# Patient Record
Sex: MALE | Race: OTHER
[De-identification: names, ages, dates, MRNs, and addresses within clinical notes are randomized per-mention and may not be internally consistent; named-entity substitution may affect disease eponyms.]

---

## 2019-03-07 PROBLEM — Z00.00 ENCOUNTER FOR PREVENTIVE HEALTH EXAMINATION: Status: ACTIVE | Noted: 2019-03-07

## 2019-03-09 ENCOUNTER — TRANSCRIPTION ENCOUNTER (OUTPATIENT)
Age: 74
End: 2019-03-09

## 2019-03-09 ENCOUNTER — APPOINTMENT (OUTPATIENT)
Dept: ORTHOPEDIC SURGERY | Facility: CLINIC | Age: 74
End: 2019-03-09
Payer: MEDICARE

## 2019-03-09 VITALS
SYSTOLIC BLOOD PRESSURE: 149 MMHG | DIASTOLIC BLOOD PRESSURE: 87 MMHG | HEART RATE: 69 BPM | BODY MASS INDEX: 36.73 KG/M2 | HEIGHT: 67 IN | WEIGHT: 234 LBS

## 2019-03-09 DIAGNOSIS — M17.0 BILATERAL PRIMARY OSTEOARTHRITIS OF KNEE: ICD-10-CM

## 2019-03-09 PROCEDURE — 72170 X-RAY EXAM OF PELVIS: CPT | Mod: 59

## 2019-03-09 PROCEDURE — 73562 X-RAY EXAM OF KNEE 3: CPT | Mod: LT

## 2019-03-09 PROCEDURE — 99204 OFFICE O/P NEW MOD 45 MIN: CPT

## 2019-03-09 NOTE — HISTORY OF PRESENT ILLNESS
[Worsening] : worsening [___ mths] : [unfilled] month(s) ago [5] : an average pain level of 5/10 [4] : a minimum pain level of 4/10 [8] : a maximum pain level of 8/10 [Walking] : worsened by walking [Knee Flexion] : worsened with knee flexion [Knee Extension] : worsened with knee extension [Recumbency] : relieved by recumbency [Rest] : relieved by rest [de-identified] : Patient here today to be evaluated for bilateral total knee replacements. The patient has exhausted all conservative treatments and is finding activities of daily living and his ambulation severely impaired. He is unable to go up and down stairs without relying very heavily on the banister this with more pain descending the stairs. He is having a difficult time getting in and out of the car and chairs without the use of the the arm rests.\par  [Acetaminophen] : not relieved by acetaminophen [Exercise Regimen] : not relieved by exercise regimen [Ice] : not relieved by ice [NSAIDs] : not relieved by nonsteroidal anti-inflammatory drugs [Physical Therapy] : not relieved by physical therapy

## 2019-03-09 NOTE — REASON FOR VISIT
[Initial Visit] : an initial visit for [Knee Pain] : knee pain [Family Member] : family member [Osteoarthritis, Knee] : osteoarthritis, knee [Spouse] : spouse

## 2019-03-09 NOTE — DISCUSSION/SUMMARY
[Medication Risks Reviewed] : Medication risks reviewed [Surgical risks reviewed] : Surgical risks reviewed [de-identified] : I evaluated this patient and will be guiding this patient's entire orthopedic management plan. The patient was advised that based upon their clinical presentation and radiographic findings they meet inclusion criteria for benefitting from a bilateral total knee arthroplasty as a treatment option for severe arthritis of the knee.\par The spectrum of treatments for severe knee DJD were reviewed in detail. I reviewed in detail the goals, alternatives, risks and benefits of total knee arthroplasty. The patient was demonstrated. A model of the total knee replacement. Advised on the brand and preliminary model of the implant that I use. Patient also understands that the preliminary discharge plan would be for the patient to go home in approximately 3 days unless otherwise not cleared by physical therapy\par \par The patient was advised of the possible complications which are inclusive of but not exclusive to VTE-related, infectious-related, anesthetic-related, surgically-related, medically-related, and implant-related. \par The patient was advised that I operate as a surgical team with his associate Dr. MAYA Sigala.\par The patient was advised that they will require a medical preoperative risk evaluation by their PCP. Further medical subspecialty clearances such as cardiac may be indicated if felt needed by their PCP.\par The patient was advised he/she may call our office after careful consideration of their treatment options and further consultation with any other physician, to begin the process of preoperative planning for elective lateral TKR at a time of their choosing. \par The patient was advised that the surgical plan would be for Dr. Keane to perform staged bilateral total. Knee arthroplasties with the right side the most painful side being side one, and after a 5 day time gap perform stage 2, the left side. The patient was advised that to indicate stage 2 surgery they would need to fulfill medical risk criteria and be deemed stable from a mobility standpoint by the PT and orthopedic team for second side surgery.\par  The patient was advised of the increased transfusion requirements for bilateral total knee/hip arthroplasties as compared to unilateral total knee/hip arthroplasty. The patient was advised of the longer time frame necessary for restoration of function and rehabilitative process. \par \par A long discussion was held with the patient in regards to the total joint replacement. Using a model to demonstrate the procedure. The risks, benefits, and alternatives to surgical intervention were discussed at length with the patient hospitalization and rehabilitation were discussed and the patient was made aware of the prophylaxis with antibiotics and the need for postoperative anticoagulation. Specific risks discussed included infection, wound breakdown, numbness, and damage to nerves, tendons, muscles, arteries and blood vessels. The possibility of recurrent pain, no improvement in pain and actual worsening of pain were also mentioned in conversation with the patient medical complications related to the patient's general medical health, including deep vein thrombosis, pulmonary embolism, heart attack, stroke, death, and other complications from anesthesia or we discussed as well. The patient was told that we will take steps to minimize these risks by using sterile technique antibiotics and DVT prophylaxis. When appropriate and to follow the patient in the clinical setting. Postoperatively to monitor her progress. The benefits of surgery were discussed and the patient was included in the conversation regarding potential for improvement of the current clinical condition. Through operative intervention. Alternatives to surgical intervention, including continued conservative management, which may yield less than optimal results in this particular patient, were discussed. All questions were answered to the satisfaction of the patient the patient will consider scheduling a joint replacement with our surgical booking desk, and we'll contact her office if there are any further questions that we may answer for this particular patient.\par \par wl

## 2019-03-09 NOTE — PHYSICAL EXAM
[Antalgic] : antalgic [LE] : Sensory: Intact in bilateral lower extremities [DP] : dorsalis pedis 2+ and symmetric bilaterally [PT] : posterior tibial 2+ and symmetric bilaterally [Normal RLE] : Right Lower Extremity: No scars, rashes, lesions, ulcers, skin intact [Normal LLE] : Left Lower Extremity: No scars, rashes, lesions, ulcers, skin intact [Normal Touch] : sensation intact for touch [Normal] : Oriented to person, place, and time, insight and judgement were intact and the affect was normal [de-identified] : 73-year-old gentleman with almost 20 years of bilateral knee pain has exhausted all conservative treatments, including a prescription nonsteroidals for many of those years of physical therapy, steroid as well as several courses of gel injections. The patient is finding activities of daily living, ambulating more than one block trying to navigate stairs and walking becoming extremely difficult. Patient is limited to less than one block of walking before the pain becomes intolerable [de-identified] : Patient comes in today for an evaluation of both knees. He has some difficulty ascending the one-step to the examining CABG on examination, the patient both knees come to full extension. He is flexing to approximately 100° bilaterally. Both knees have a varus attitude the right knee. Will straighten more than 5° to come to length with the medial collateral ligament. The left knee comes to almost 5° of straightening to valgus angulation. There is grade 3 to grade 4 popliteal fullness behind both knees. Calves are nontender. Neurovascular status is intact in both lower extremities good strength against EHL and dorsiflexion [de-identified] : Sees a  cardiologis tFor routine checkup [de-identified] : Chronic bronchitis. sees a pulmonologist [de-identified] : AP pelvisdone in the office today showsnatural hips in good repair\par \par 3 views of both knees in the office today show bone-on-bone wear on the medial joint line bilaterally. There is significant debris in the medial joint line bilaterally. There is significant lateralization of both patella with apparent aching over the lateral femoral condyle and debris in the patellofemoral articulation

## 2019-04-23 ENCOUNTER — OUTPATIENT (OUTPATIENT)
Dept: OUTPATIENT SERVICES | Facility: HOSPITAL | Age: 74
LOS: 1 days | End: 2019-04-23
Payer: COMMERCIAL

## 2019-04-23 VITALS
SYSTOLIC BLOOD PRESSURE: 100 MMHG | HEART RATE: 67 BPM | OXYGEN SATURATION: 96 % | RESPIRATION RATE: 14 BRPM | DIASTOLIC BLOOD PRESSURE: 70 MMHG | HEIGHT: 67 IN | TEMPERATURE: 98 F | WEIGHT: 232.59 LBS

## 2019-04-23 DIAGNOSIS — M17.11 UNILATERAL PRIMARY OSTEOARTHRITIS, RIGHT KNEE: ICD-10-CM

## 2019-04-23 DIAGNOSIS — Z98.890 OTHER SPECIFIED POSTPROCEDURAL STATES: Chronic | ICD-10-CM

## 2019-04-23 DIAGNOSIS — Z01.818 ENCOUNTER FOR OTHER PREPROCEDURAL EXAMINATION: ICD-10-CM

## 2019-04-23 DIAGNOSIS — M19.90 UNSPECIFIED OSTEOARTHRITIS, UNSPECIFIED SITE: ICD-10-CM

## 2019-04-23 DIAGNOSIS — Z87.898 PERSONAL HISTORY OF OTHER SPECIFIED CONDITIONS: ICD-10-CM

## 2019-04-23 LAB
ALBUMIN SERPL ELPH-MCNC: 4 G/DL — SIGNIFICANT CHANGE UP (ref 3.3–5)
ALP SERPL-CCNC: 74 U/L — SIGNIFICANT CHANGE UP (ref 30–120)
ALT FLD-CCNC: 25 U/L DA — SIGNIFICANT CHANGE UP (ref 10–60)
ANION GAP SERPL CALC-SCNC: 9 MMOL/L — SIGNIFICANT CHANGE UP (ref 5–17)
APTT BLD: 32.5 SEC — SIGNIFICANT CHANGE UP (ref 28.5–37)
AST SERPL-CCNC: 18 U/L — SIGNIFICANT CHANGE UP (ref 10–40)
BILIRUB SERPL-MCNC: 0.4 MG/DL — SIGNIFICANT CHANGE UP (ref 0.2–1.2)
BLD GP AB SCN SERPL QL: SIGNIFICANT CHANGE UP
BUN SERPL-MCNC: 22 MG/DL — SIGNIFICANT CHANGE UP (ref 7–23)
CALCIUM SERPL-MCNC: 9.7 MG/DL — SIGNIFICANT CHANGE UP (ref 8.4–10.5)
CHLORIDE SERPL-SCNC: 103 MMOL/L — SIGNIFICANT CHANGE UP (ref 96–108)
CO2 SERPL-SCNC: 31 MMOL/L — SIGNIFICANT CHANGE UP (ref 22–31)
CREAT SERPL-MCNC: 1.09 MG/DL — SIGNIFICANT CHANGE UP (ref 0.5–1.3)
GLUCOSE SERPL-MCNC: 135 MG/DL — HIGH (ref 70–99)
HCT VFR BLD CALC: 41.9 % — SIGNIFICANT CHANGE UP (ref 39–50)
HGB BLD-MCNC: 13.9 G/DL — SIGNIFICANT CHANGE UP (ref 13–17)
INR BLD: 1.11 RATIO — SIGNIFICANT CHANGE UP (ref 0.88–1.16)
MCHC RBC-ENTMCNC: 32.2 PG — SIGNIFICANT CHANGE UP (ref 27–34)
MCHC RBC-ENTMCNC: 33.2 GM/DL — SIGNIFICANT CHANGE UP (ref 32–36)
MCV RBC AUTO: 97 FL — SIGNIFICANT CHANGE UP (ref 80–100)
MRSA PCR RESULT.: SIGNIFICANT CHANGE UP
NRBC # BLD: 0 /100 WBCS — SIGNIFICANT CHANGE UP (ref 0–0)
PLATELET # BLD AUTO: 224 K/UL — SIGNIFICANT CHANGE UP (ref 150–400)
POTASSIUM SERPL-MCNC: 3.5 MMOL/L — SIGNIFICANT CHANGE UP (ref 3.5–5.3)
POTASSIUM SERPL-SCNC: 3.5 MMOL/L — SIGNIFICANT CHANGE UP (ref 3.5–5.3)
PROT SERPL-MCNC: 7.8 G/DL — SIGNIFICANT CHANGE UP (ref 6–8.3)
PROTHROM AB SERPL-ACNC: 12.1 SEC — SIGNIFICANT CHANGE UP (ref 10–12.9)
RBC # BLD: 4.32 M/UL — SIGNIFICANT CHANGE UP (ref 4.2–5.8)
RBC # FLD: 12.8 % — SIGNIFICANT CHANGE UP (ref 10.3–14.5)
S AUREUS DNA NOSE QL NAA+PROBE: SIGNIFICANT CHANGE UP
SODIUM SERPL-SCNC: 143 MMOL/L — SIGNIFICANT CHANGE UP (ref 135–145)
WBC # BLD: 8.16 K/UL — SIGNIFICANT CHANGE UP (ref 3.8–10.5)
WBC # FLD AUTO: 8.16 K/UL — SIGNIFICANT CHANGE UP (ref 3.8–10.5)

## 2019-04-23 PROCEDURE — 93005 ELECTROCARDIOGRAM TRACING: CPT

## 2019-04-23 PROCEDURE — 93010 ELECTROCARDIOGRAM REPORT: CPT

## 2019-04-23 PROCEDURE — 87641 MR-STAPH DNA AMP PROBE: CPT

## 2019-04-23 PROCEDURE — 85610 PROTHROMBIN TIME: CPT

## 2019-04-23 PROCEDURE — 85027 COMPLETE CBC AUTOMATED: CPT

## 2019-04-23 PROCEDURE — 36415 COLL VENOUS BLD VENIPUNCTURE: CPT

## 2019-04-23 PROCEDURE — 86900 BLOOD TYPING SEROLOGIC ABO: CPT

## 2019-04-23 PROCEDURE — 87640 STAPH A DNA AMP PROBE: CPT

## 2019-04-23 PROCEDURE — 86850 RBC ANTIBODY SCREEN: CPT

## 2019-04-23 PROCEDURE — 86901 BLOOD TYPING SEROLOGIC RH(D): CPT

## 2019-04-23 PROCEDURE — G0463: CPT

## 2019-04-23 PROCEDURE — 80053 COMPREHEN METABOLIC PANEL: CPT

## 2019-04-23 PROCEDURE — 85730 THROMBOPLASTIN TIME PARTIAL: CPT

## 2019-04-23 NOTE — H&P PST ADULT - NSICDXPASTSURGICALHX_GEN_ALL_CORE_FT
PAST SURGICAL HISTORY:  S/P arthroscopy of left knee     S/P correction of deviated nasal septum 2009    S/P left inguinal hernia repair

## 2019-04-23 NOTE — H&P PST ADULT - HISTORY OF PRESENT ILLNESS
This is a 73 year old presents with B/L knee pain worse when walking and climbing stairs. Reports intermittent pain and pain is relieved with rest. Treated with injections with some relief however pain continued. Schedule for right knee replacement stage I.

## 2019-04-23 NOTE — H&P PST ADULT - NSICDXPROBLEM_GEN_ALL_CORE_FT
PROBLEM DIAGNOSES  Problem: History of snoring  Assessment and Plan: ABI precautions   Referred to pulmonologist   Pulmonologist clearance     Problem: Osteoarthritis  Assessment and Plan: Right knee reoplacement   Medical , cardaic clearance   Pre op instructions   Pharmacy consult ; Not available

## 2019-05-06 NOTE — PHARMACOTHERAPY INTERVENTION NOTE - COMMENTS
Presurgical evaluation for postoperative medication management. Team emailed. Note placed in Kenneth.

## 2019-05-06 NOTE — PROGRESS NOTE ADULT - SUBJECTIVE AND OBJECTIVE BOX
No Known Allergies:     Home Medications (per PST H&P):   · 	tamsulosin 0.4 mg once a day (at bedtime)  · 	amLODIPine 10 mg once a day  · 	diclofenac sodium 50 mg once a day  · 	chlorthalidone 25 mg once a day  · 	losartan-hydroCHLOROthiazide 100 mg-25 mg once a day  · 	montelukast 10 mg once a day  · 	aspirin EC 81 mg once a day  · 	fluticasone 50 mcg/inh 1 spray(s) nasal once a day  · 	Fluticasone-Umeclidinium-Vilanterol (Trelegy Ellipta) 1 puff(s) inhaled once a day  · 	Ventolin HFA 90 mcg/inh 2 puff(s) inhaled 4 times a day, As Needed - for allergy symptoms    PAST MEDICAL HISTORY:  Asthmatic bronchitis, chronic   BPH (benign prostatic hyperplasia)  COPD  HTN (hypertension)   Osteoarthritis     PAST SURGICAL HISTORY:  S/P arthroscopy of left knee   S/P correction of deviated nasal septum 2009  S/P left inguinal hernia repair     Patient reports history of daily use of cocaine, marijuana, and LST for 15 years. Quit 1999. Currently drinks 1-2 glasses alcohol 4 or more times per week.    PST values of interest:  SCr 1.09 mg/dL  LFTs WNL  QTc 438ms (borderline)

## 2019-05-07 RX ORDER — DOCUSATE SODIUM 100 MG
100 CAPSULE ORAL THREE TIMES A DAY
Qty: 0 | Refills: 0 | Status: DISCONTINUED | OUTPATIENT
Start: 2019-05-08 | End: 2019-05-13

## 2019-05-07 RX ORDER — POLYETHYLENE GLYCOL 3350 17 G/17G
17 POWDER, FOR SOLUTION ORAL DAILY
Qty: 0 | Refills: 0 | Status: DISCONTINUED | OUTPATIENT
Start: 2019-05-08 | End: 2019-05-13

## 2019-05-07 RX ORDER — SODIUM CHLORIDE 9 MG/ML
1000 INJECTION, SOLUTION INTRAVENOUS
Qty: 0 | Refills: 0 | Status: DISCONTINUED | OUTPATIENT
Start: 2019-05-08 | End: 2019-05-09

## 2019-05-07 RX ORDER — MAGNESIUM HYDROXIDE 400 MG/1
30 TABLET, CHEWABLE ORAL DAILY
Qty: 0 | Refills: 0 | Status: DISCONTINUED | OUTPATIENT
Start: 2019-05-08 | End: 2019-05-13

## 2019-05-07 RX ORDER — SENNA PLUS 8.6 MG/1
2 TABLET ORAL AT BEDTIME
Qty: 0 | Refills: 0 | Status: DISCONTINUED | OUTPATIENT
Start: 2019-05-08 | End: 2019-05-13

## 2019-05-07 RX ORDER — PANTOPRAZOLE SODIUM 20 MG/1
40 TABLET, DELAYED RELEASE ORAL
Qty: 0 | Refills: 0 | Status: DISCONTINUED | OUTPATIENT
Start: 2019-05-08 | End: 2019-05-13

## 2019-05-07 RX ORDER — ONDANSETRON 8 MG/1
4 TABLET, FILM COATED ORAL EVERY 6 HOURS
Qty: 0 | Refills: 0 | Status: DISCONTINUED | OUTPATIENT
Start: 2019-05-08 | End: 2019-05-13

## 2019-05-08 ENCOUNTER — RESULT REVIEW (OUTPATIENT)
Age: 74
End: 2019-05-08

## 2019-05-08 ENCOUNTER — APPOINTMENT (OUTPATIENT)
Dept: ORTHOPEDIC SURGERY | Facility: HOSPITAL | Age: 74
End: 2019-05-08

## 2019-05-08 ENCOUNTER — INPATIENT (INPATIENT)
Facility: HOSPITAL | Age: 74
LOS: 7 days | Discharge: HOME | DRG: 462 | End: 2019-05-16
Attending: ORTHOPAEDIC SURGERY | Admitting: ORTHOPAEDIC SURGERY
Payer: COMMERCIAL

## 2019-05-08 VITALS
WEIGHT: 225.97 LBS | TEMPERATURE: 98 F | HEIGHT: 67 IN | DIASTOLIC BLOOD PRESSURE: 68 MMHG | HEART RATE: 74 BPM | OXYGEN SATURATION: 98 % | SYSTOLIC BLOOD PRESSURE: 145 MMHG | RESPIRATION RATE: 8 BRPM

## 2019-05-08 DIAGNOSIS — Z98.890 OTHER SPECIFIED POSTPROCEDURAL STATES: Chronic | ICD-10-CM

## 2019-05-08 DIAGNOSIS — M17.11 UNILATERAL PRIMARY OSTEOARTHRITIS, RIGHT KNEE: ICD-10-CM

## 2019-05-08 DIAGNOSIS — Z01.818 ENCOUNTER FOR OTHER PREPROCEDURAL EXAMINATION: ICD-10-CM

## 2019-05-08 PROBLEM — N40.0 BENIGN PROSTATIC HYPERPLASIA WITHOUT LOWER URINARY TRACT SYMPTOMS: Chronic | Status: ACTIVE | Noted: 2019-04-23

## 2019-05-08 PROBLEM — M19.90 UNSPECIFIED OSTEOARTHRITIS, UNSPECIFIED SITE: Chronic | Status: ACTIVE | Noted: 2019-04-23

## 2019-05-08 PROBLEM — I10 ESSENTIAL (PRIMARY) HYPERTENSION: Chronic | Status: ACTIVE | Noted: 2019-04-23

## 2019-05-08 PROBLEM — J44.9 CHRONIC OBSTRUCTIVE PULMONARY DISEASE, UNSPECIFIED: Chronic | Status: ACTIVE | Noted: 2019-04-23

## 2019-05-08 LAB
ANION GAP SERPL CALC-SCNC: 10 MMOL/L — SIGNIFICANT CHANGE UP (ref 5–17)
BUN SERPL-MCNC: 14 MG/DL — SIGNIFICANT CHANGE UP (ref 7–23)
CALCIUM SERPL-MCNC: 8.9 MG/DL — SIGNIFICANT CHANGE UP (ref 8.4–10.5)
CHLORIDE SERPL-SCNC: 103 MMOL/L — SIGNIFICANT CHANGE UP (ref 96–108)
CO2 SERPL-SCNC: 25 MMOL/L — SIGNIFICANT CHANGE UP (ref 22–31)
CREAT SERPL-MCNC: 1.07 MG/DL — SIGNIFICANT CHANGE UP (ref 0.5–1.3)
GLUCOSE SERPL-MCNC: 180 MG/DL — HIGH (ref 70–99)
HCT VFR BLD CALC: 39.4 % — SIGNIFICANT CHANGE UP (ref 39–50)
HGB BLD-MCNC: 13 G/DL — SIGNIFICANT CHANGE UP (ref 13–17)
POTASSIUM SERPL-MCNC: 3.9 MMOL/L — SIGNIFICANT CHANGE UP (ref 3.5–5.3)
POTASSIUM SERPL-SCNC: 3.9 MMOL/L — SIGNIFICANT CHANGE UP (ref 3.5–5.3)
SODIUM SERPL-SCNC: 138 MMOL/L — SIGNIFICANT CHANGE UP (ref 135–145)

## 2019-05-08 PROCEDURE — 88305 TISSUE EXAM BY PATHOLOGIST: CPT | Mod: 26

## 2019-05-08 PROCEDURE — 27447 TOTAL KNEE ARTHROPLASTY: CPT | Mod: RT

## 2019-05-08 PROCEDURE — 99223 1ST HOSP IP/OBS HIGH 75: CPT

## 2019-05-08 PROCEDURE — 88311 DECALCIFY TISSUE: CPT | Mod: 26

## 2019-05-08 PROCEDURE — 73562 X-RAY EXAM OF KNEE 3: CPT | Mod: 26,RT

## 2019-05-08 PROCEDURE — 27447 TOTAL KNEE ARTHROPLASTY: CPT | Mod: 82,RT

## 2019-05-08 RX ORDER — BUDESONIDE AND FORMOTEROL FUMARATE DIHYDRATE 160; 4.5 UG/1; UG/1
2 AEROSOL RESPIRATORY (INHALATION)
Qty: 0 | Refills: 0 | Status: DISCONTINUED | OUTPATIENT
Start: 2019-05-08 | End: 2019-05-13

## 2019-05-08 RX ORDER — ACETAMINOPHEN 500 MG
1000 TABLET ORAL EVERY 6 HOURS
Qty: 0 | Refills: 0 | Status: COMPLETED | OUTPATIENT
Start: 2019-05-08 | End: 2019-05-09

## 2019-05-08 RX ORDER — SODIUM CHLORIDE 9 MG/ML
1000 INJECTION, SOLUTION INTRAVENOUS
Qty: 0 | Refills: 0 | Status: DISCONTINUED | OUTPATIENT
Start: 2019-05-08 | End: 2019-05-08

## 2019-05-08 RX ORDER — TAMSULOSIN HYDROCHLORIDE 0.4 MG/1
0.4 CAPSULE ORAL AT BEDTIME
Qty: 0 | Refills: 0 | Status: DISCONTINUED | OUTPATIENT
Start: 2019-05-08 | End: 2019-05-13

## 2019-05-08 RX ORDER — CELECOXIB 200 MG/1
200 CAPSULE ORAL EVERY 12 HOURS
Qty: 0 | Refills: 0 | Status: COMPLETED | OUTPATIENT
Start: 2019-05-08 | End: 2019-05-12

## 2019-05-08 RX ORDER — CEFAZOLIN SODIUM 1 G
2000 VIAL (EA) INJECTION ONCE
Qty: 0 | Refills: 0 | Status: COMPLETED | OUTPATIENT
Start: 2019-05-08 | End: 2019-05-08

## 2019-05-08 RX ORDER — ACETAMINOPHEN 500 MG
1000 TABLET ORAL EVERY 8 HOURS
Qty: 0 | Refills: 0 | Status: DISCONTINUED | OUTPATIENT
Start: 2019-05-09 | End: 2019-05-13

## 2019-05-08 RX ORDER — ENOXAPARIN SODIUM 100 MG/ML
30 INJECTION SUBCUTANEOUS EVERY 12 HOURS
Qty: 0 | Refills: 0 | Status: COMPLETED | OUTPATIENT
Start: 2019-05-09 | End: 2019-05-12

## 2019-05-08 RX ORDER — FLUTICASONE PROPIONATE 50 MCG
1 SPRAY, SUSPENSION NASAL
Qty: 0 | Refills: 0 | Status: DISCONTINUED | OUTPATIENT
Start: 2019-05-08 | End: 2019-05-13

## 2019-05-08 RX ORDER — MONTELUKAST 4 MG/1
10 TABLET, CHEWABLE ORAL DAILY
Qty: 0 | Refills: 0 | Status: DISCONTINUED | OUTPATIENT
Start: 2019-05-08 | End: 2019-05-13

## 2019-05-08 RX ORDER — TRANEXAMIC ACID 100 MG/ML
1000 INJECTION, SOLUTION INTRAVENOUS ONCE
Qty: 0 | Refills: 0 | Status: COMPLETED | OUTPATIENT
Start: 2019-05-08 | End: 2019-05-08

## 2019-05-08 RX ORDER — AMLODIPINE BESYLATE 2.5 MG/1
10 TABLET ORAL DAILY
Qty: 0 | Refills: 0 | Status: DISCONTINUED | OUTPATIENT
Start: 2019-05-08 | End: 2019-05-13

## 2019-05-08 RX ORDER — CHLORHEXIDINE GLUCONATE 213 G/1000ML
1 SOLUTION TOPICAL ONCE
Qty: 0 | Refills: 0 | Status: COMPLETED | OUTPATIENT
Start: 2019-05-08 | End: 2019-05-08

## 2019-05-08 RX ORDER — HYDROMORPHONE HYDROCHLORIDE 2 MG/ML
0.5 INJECTION INTRAMUSCULAR; INTRAVENOUS; SUBCUTANEOUS
Qty: 0 | Refills: 0 | Status: DISCONTINUED | OUTPATIENT
Start: 2019-05-08 | End: 2019-05-13

## 2019-05-08 RX ORDER — LOSARTAN POTASSIUM 100 MG/1
100 TABLET, FILM COATED ORAL DAILY
Qty: 0 | Refills: 0 | Status: DISCONTINUED | OUTPATIENT
Start: 2019-05-10 | End: 2019-05-13

## 2019-05-08 RX ORDER — CEFAZOLIN SODIUM 1 G
2000 VIAL (EA) INJECTION EVERY 8 HOURS
Qty: 0 | Refills: 0 | Status: COMPLETED | OUTPATIENT
Start: 2019-05-08 | End: 2019-05-08

## 2019-05-08 RX ORDER — ONDANSETRON 8 MG/1
4 TABLET, FILM COATED ORAL ONCE
Qty: 0 | Refills: 0 | Status: DISCONTINUED | OUTPATIENT
Start: 2019-05-08 | End: 2019-05-08

## 2019-05-08 RX ORDER — OXYCODONE HYDROCHLORIDE 5 MG/1
10 TABLET ORAL
Qty: 0 | Refills: 0 | Status: DISCONTINUED | OUTPATIENT
Start: 2019-05-08 | End: 2019-05-13

## 2019-05-08 RX ORDER — APREPITANT 80 MG/1
40 CAPSULE ORAL ONCE
Qty: 0 | Refills: 0 | Status: COMPLETED | OUTPATIENT
Start: 2019-05-08 | End: 2019-05-08

## 2019-05-08 RX ORDER — FLUTICASONE FUROATE, UMECLIDINIUM BROMIDE AND VILANTEROL TRIFENATATE 200; 62.5; 25 UG/1; UG/1; UG/1
1 POWDER RESPIRATORY (INHALATION)
Qty: 0 | Refills: 0 | COMMUNITY

## 2019-05-08 RX ORDER — HYDROMORPHONE HYDROCHLORIDE 2 MG/ML
0.5 INJECTION INTRAMUSCULAR; INTRAVENOUS; SUBCUTANEOUS
Qty: 0 | Refills: 0 | Status: DISCONTINUED | OUTPATIENT
Start: 2019-05-08 | End: 2019-05-08

## 2019-05-08 RX ORDER — ALBUTEROL 90 UG/1
2 AEROSOL, METERED ORAL EVERY 6 HOURS
Qty: 0 | Refills: 0 | Status: DISCONTINUED | OUTPATIENT
Start: 2019-05-08 | End: 2019-05-13

## 2019-05-08 RX ORDER — ACETAMINOPHEN 500 MG
1000 TABLET ORAL ONCE
Qty: 0 | Refills: 0 | Status: COMPLETED | OUTPATIENT
Start: 2019-05-08 | End: 2019-05-08

## 2019-05-08 RX ORDER — OXYCODONE HYDROCHLORIDE 5 MG/1
5 TABLET ORAL
Qty: 0 | Refills: 0 | Status: DISCONTINUED | OUTPATIENT
Start: 2019-05-08 | End: 2019-05-13

## 2019-05-08 RX ADMIN — SENNA PLUS 2 TABLET(S): 8.6 TABLET ORAL at 21:25

## 2019-05-08 RX ADMIN — HYDROMORPHONE HYDROCHLORIDE 0.5 MILLIGRAM(S): 2 INJECTION INTRAMUSCULAR; INTRAVENOUS; SUBCUTANEOUS at 23:55

## 2019-05-08 RX ADMIN — HYDROMORPHONE HYDROCHLORIDE 0.5 MILLIGRAM(S): 2 INJECTION INTRAMUSCULAR; INTRAVENOUS; SUBCUTANEOUS at 10:14

## 2019-05-08 RX ADMIN — Medication 400 MILLIGRAM(S): at 20:25

## 2019-05-08 RX ADMIN — HYDROMORPHONE HYDROCHLORIDE 0.5 MILLIGRAM(S): 2 INJECTION INTRAMUSCULAR; INTRAVENOUS; SUBCUTANEOUS at 19:10

## 2019-05-08 RX ADMIN — HYDROMORPHONE HYDROCHLORIDE 0.5 MILLIGRAM(S): 2 INJECTION INTRAMUSCULAR; INTRAVENOUS; SUBCUTANEOUS at 11:35

## 2019-05-08 RX ADMIN — Medication 100 MILLIGRAM(S): at 21:25

## 2019-05-08 RX ADMIN — HYDROMORPHONE HYDROCHLORIDE 0.5 MILLIGRAM(S): 2 INJECTION INTRAMUSCULAR; INTRAVENOUS; SUBCUTANEOUS at 11:20

## 2019-05-08 RX ADMIN — Medication 100 MILLIGRAM(S): at 23:57

## 2019-05-08 RX ADMIN — CELECOXIB 200 MILLIGRAM(S): 200 CAPSULE ORAL at 21:25

## 2019-05-08 RX ADMIN — HYDROMORPHONE HYDROCHLORIDE 0.5 MILLIGRAM(S): 2 INJECTION INTRAMUSCULAR; INTRAVENOUS; SUBCUTANEOUS at 18:53

## 2019-05-08 RX ADMIN — APREPITANT 40 MILLIGRAM(S): 80 CAPSULE ORAL at 06:05

## 2019-05-08 RX ADMIN — Medication 100 MILLIGRAM(S): at 16:34

## 2019-05-08 RX ADMIN — Medication 1000 MILLIGRAM(S): at 15:52

## 2019-05-08 RX ADMIN — SODIUM CHLORIDE 125 MILLILITER(S): 9 INJECTION, SOLUTION INTRAVENOUS at 16:35

## 2019-05-08 RX ADMIN — Medication 400 MILLIGRAM(S): at 14:01

## 2019-05-08 RX ADMIN — HYDROMORPHONE HYDROCHLORIDE 0.5 MILLIGRAM(S): 2 INJECTION INTRAMUSCULAR; INTRAVENOUS; SUBCUTANEOUS at 13:40

## 2019-05-08 RX ADMIN — Medication 1000 MILLIGRAM(S): at 20:25

## 2019-05-08 RX ADMIN — BUDESONIDE AND FORMOTEROL FUMARATE DIHYDRATE 2 PUFF(S): 160; 4.5 AEROSOL RESPIRATORY (INHALATION) at 18:52

## 2019-05-08 RX ADMIN — HYDROMORPHONE HYDROCHLORIDE 0.5 MILLIGRAM(S): 2 INJECTION INTRAMUSCULAR; INTRAVENOUS; SUBCUTANEOUS at 23:40

## 2019-05-08 RX ADMIN — CHLORHEXIDINE GLUCONATE 1 APPLICATION(S): 213 SOLUTION TOPICAL at 06:05

## 2019-05-08 RX ADMIN — HYDROMORPHONE HYDROCHLORIDE 0.5 MILLIGRAM(S): 2 INJECTION INTRAMUSCULAR; INTRAVENOUS; SUBCUTANEOUS at 10:30

## 2019-05-08 RX ADMIN — Medication 1 SPRAY(S): at 18:52

## 2019-05-08 RX ADMIN — TAMSULOSIN HYDROCHLORIDE 0.4 MILLIGRAM(S): 0.4 CAPSULE ORAL at 21:25

## 2019-05-08 RX ADMIN — HYDROMORPHONE HYDROCHLORIDE 0.5 MILLIGRAM(S): 2 INJECTION INTRAMUSCULAR; INTRAVENOUS; SUBCUTANEOUS at 13:24

## 2019-05-08 NOTE — CONSULT NOTE ADULT - SUBJECTIVE AND OBJECTIVE BOX
Information Obtained from:  EHR, Physical Chart, Patient at bedside (relevant EHR and Chart information verified with patient)    CC : Patient is a 73y old  Male who presents with a chief complaint of b/l knee pain    HPI:  72yo M admitted s/p Stage 1 Right TKR today.  Has had progressively worsening b/l knee pain, sharp, non-radiating, tried injections with partial relief, up to 8/10 with activity, improves with rest and NSAIDS.     REVIEW OF SYSTEMS:  CONSTITUTIONAL: No fever, weight loss, or fatigue  EYES: No eye pain, visual disturbances, or discharge  ENMT:  No difficulty hearing, tinnitus, vertigo; No sinus or throat pain  NECK: No pain or stiffness  BREASTS: No pain, masses, or nipple discharge  RESPIRATORY: No cough, wheezing, chills or hemoptysis; No shortness of breath  CARDIOVASCULAR: No chest pain, palpitations, dizziness, or leg swelling  GASTROINTESTINAL: No abdominal or epigastric pain. No nausea, vomiting, or hematemesis; No diarrhea or constipation. No melena or hematochezia.  GENITOURINARY: No dysuria, frequency, hematuria, or incontinence  NEUROLOGICAL: No headaches, memory loss, loss of strength, numbness, or tremors  SKIN: No itching, burning, rashes, or lesions   LYMPH NODES: No enlarged glands  ENDOCRINE: No heat or cold intolerance; No hair loss  MUSCULOSKELETAL: No muscle or back pain  PSYCHIATRIC: No depression, anxiety, mood swings, or difficulty sleeping  HEME/LYMPH: No easy bruising, or bleeding gums  ALLERGY AND IMMUNOLOGIC: No hives or eczema    PAST MEDICAL & SURGICAL HISTORY:  HTN (hypertension)  BPH (benign prostatic hyperplasia)  Asthmatic bronchitis , chronic  Osteoarthritis  S/P arthroscopy of left knee  S/P correction of deviated nasal septum: 2009  S/P left inguinal hernia repair      SOCIAL HISTORY:  Lives: alone  Smoking Hx: 22PY,quit 1995  ETOH Hx: 1-2 drinks, >4x a week on average  Illicit Drug Use:  remote use of LSD, THC, LSD, quit all in 1999    Allergies    No Known Allergies    Intolerances    Home Medications:  amLODIPine 10 mg oral tablet: 1 tab(s) orally once a day (08 May 2019 06:03)  aspirin 81 mg oral delayed release tablet: 1 tab(s) orally once a day (08 May 2019 06:03)  Breo Ellipta 100 mcg-25 mcg/inh inhalation powder: 1 puff(s) inhaled once a day (08 May 2019 06:03)  chlorthalidone 25 mg oral tablet: 1 tab(s) orally once a day (08 May 2019 06:03)  diclofenac sodium 50 mg oral delayed release tablet: 50 milligram(s) orally once a day (08 May 2019 06:03)  fluticasone 50 mcg/inh nasal spray: 1 spray(s) nasal once a day (08 May 2019 06:03)  losartan-hydroCHLOROthiazide 100 mg-25 mg oral tablet: 1 tab(s) orally once a day (08 May 2019 06:03)  montelukast 10 mg oral tablet: 1 tab(s) orally once a day (08 May 2019 06:03)  tamsulosin 0.4 mg oral capsule: 1 cap(s) orally once a day (at bedtime) (08 May 2019 06:03)  Ventolin HFA 90 mcg/inh inhalation aerosol: 2 puff(s) inhaled 4 times a day, As Needed - for allergy symptoms (08 May 2019 06:03)    FAMILY HISTORY:  FH: HTN (hypertension): mother  Family history of prostate cancer in father    PHYSICAL EXAM:  Vital Signs Last 24 Hrs  T(C): 36.5 (08 May 2019 09:42), Max: 36.8 (08 May 2019 06:06)  T(F): 97.7 (08 May 2019 09:42), Max: 98.3 (08 May 2019 06:06)  HR: 66 (08 May 2019 10:15) (66 - 86)  BP: 121/54 (08 May 2019 10:15) (114/65 - 145/68)  BP(mean): --  RR: 12 (08 May 2019 10:15) (8 - 21)  SpO2: 98% (08 May 2019 10:15) (96% - 100%)    GENERAL: NAD, well-groomed, well-developed, awake, alert, oriented x 3, fluent and coherent speech  EYES: EOMI, PERRLA, conjunctiva and sclera clear  ENMT: No tonsillar erythema, exudates, or enlargement; Moist mucous membranes, Good dentition, No lesions  NECK: Supple, No JVD, No Cervical LAD, No thyromegaly, No thyroid nodules  NERVOUS SYSTEM:  Good concentration; No facial droop  CHEST WALL: No masses  CHEST/LUNG: Clear to auscultation bilaterally; No rales, rhonchi, wheezing, or rubs  HEART: Regular rate and rhythm; No murmurs, rubs, or gallops  ABDOMEN: Soft, Nontender, Nondistended, Bowel sounds present, No palpable masses or organomegaly, No bruits  EXTREMITIES:  2+ Peripheral Pulses, No clubbing, cyanosis, or edema  INCISION:  Dressing clean/dry/intact        EKG (was personally reviewed): yes, sinus jannie 54, 1st deg A-V delay

## 2019-05-08 NOTE — CONSULT NOTE ADULT - ASSESSMENT
POD#0 s/p Stage 1 RIGHT TKR  - Pain control  - Bowel regimen  - PT/OT  - VTE PPx - Lovenox until stage 2    HTN  - continue amlodipine with hold parameters  - hold diuretics until discharge  - resume ARB on POD#2    Chronic Aspirin Use  -     Asthmatic Bronchitis  - use Symbicort 160/4.5 2 puffs daily instead of Trelegy   - continue Ventolin PRN, Montelukast, Fluticasone    BPH  - continue Tamsulosin POD#0 s/p Stage 1 RIGHT TKR  - Pain control  - Bowel regimen  - PT/OT  - VTE PPx - Lovenox until stage 2    HTN  - continue amlodipine with hold parameters  - hold diuretics until discharge, confirmed with patient he takes both HCTZ (part of combo pill) and chlorthalidone  - resume ARB on POD#2    Chronic Aspirin Use  - for primary prevention  - Stress Test 4/2019 - no evidence for ischemia    Asthmatic Bronchitis  - use Symbicort 160/4.5 2 puffs daily instead of Trelegy   - continue Ventolin PRN, Montelukast, Fluticasone    BPH  - continue Tamsulosin

## 2019-05-09 LAB
ANION GAP SERPL CALC-SCNC: 9 MMOL/L — SIGNIFICANT CHANGE UP (ref 5–17)
BUN SERPL-MCNC: 14 MG/DL — SIGNIFICANT CHANGE UP (ref 7–23)
CALCIUM SERPL-MCNC: 9 MG/DL — SIGNIFICANT CHANGE UP (ref 8.4–10.5)
CHLORIDE SERPL-SCNC: 103 MMOL/L — SIGNIFICANT CHANGE UP (ref 96–108)
CO2 SERPL-SCNC: 26 MMOL/L — SIGNIFICANT CHANGE UP (ref 22–31)
CREAT SERPL-MCNC: 0.9 MG/DL — SIGNIFICANT CHANGE UP (ref 0.5–1.3)
GLUCOSE SERPL-MCNC: 136 MG/DL — HIGH (ref 70–99)
HCT VFR BLD CALC: 35.7 % — LOW (ref 39–50)
HGB BLD-MCNC: 11.7 G/DL — LOW (ref 13–17)
MCHC RBC-ENTMCNC: 31.6 PG — SIGNIFICANT CHANGE UP (ref 27–34)
MCHC RBC-ENTMCNC: 32.8 GM/DL — SIGNIFICANT CHANGE UP (ref 32–36)
MCV RBC AUTO: 96.5 FL — SIGNIFICANT CHANGE UP (ref 80–100)
NRBC # BLD: 0 /100 WBCS — SIGNIFICANT CHANGE UP (ref 0–0)
PLATELET # BLD AUTO: 159 K/UL — SIGNIFICANT CHANGE UP (ref 150–400)
POTASSIUM SERPL-MCNC: 4.3 MMOL/L — SIGNIFICANT CHANGE UP (ref 3.5–5.3)
POTASSIUM SERPL-SCNC: 4.3 MMOL/L — SIGNIFICANT CHANGE UP (ref 3.5–5.3)
RBC # BLD: 3.7 M/UL — LOW (ref 4.2–5.8)
RBC # FLD: 12.7 % — SIGNIFICANT CHANGE UP (ref 10.3–14.5)
SODIUM SERPL-SCNC: 138 MMOL/L — SIGNIFICANT CHANGE UP (ref 135–145)
WBC # BLD: 13.4 K/UL — HIGH (ref 3.8–10.5)
WBC # FLD AUTO: 13.4 K/UL — HIGH (ref 3.8–10.5)

## 2019-05-09 PROCEDURE — 99233 SBSQ HOSP IP/OBS HIGH 50: CPT

## 2019-05-09 RX ADMIN — OXYCODONE HYDROCHLORIDE 10 MILLIGRAM(S): 5 TABLET ORAL at 12:00

## 2019-05-09 RX ADMIN — Medication 1 SPRAY(S): at 17:01

## 2019-05-09 RX ADMIN — Medication 100 MILLIGRAM(S): at 21:10

## 2019-05-09 RX ADMIN — CELECOXIB 200 MILLIGRAM(S): 200 CAPSULE ORAL at 09:35

## 2019-05-09 RX ADMIN — OXYCODONE HYDROCHLORIDE 5 MILLIGRAM(S): 5 TABLET ORAL at 08:07

## 2019-05-09 RX ADMIN — CELECOXIB 200 MILLIGRAM(S): 200 CAPSULE ORAL at 21:10

## 2019-05-09 RX ADMIN — Medication 100 MILLIGRAM(S): at 14:09

## 2019-05-09 RX ADMIN — CELECOXIB 200 MILLIGRAM(S): 200 CAPSULE ORAL at 09:05

## 2019-05-09 RX ADMIN — Medication 100 MILLIGRAM(S): at 05:24

## 2019-05-09 RX ADMIN — ENOXAPARIN SODIUM 30 MILLIGRAM(S): 100 INJECTION SUBCUTANEOUS at 09:05

## 2019-05-09 RX ADMIN — SODIUM CHLORIDE 125 MILLILITER(S): 9 INJECTION, SOLUTION INTRAVENOUS at 08:20

## 2019-05-09 RX ADMIN — OXYCODONE HYDROCHLORIDE 10 MILLIGRAM(S): 5 TABLET ORAL at 17:30

## 2019-05-09 RX ADMIN — Medication 1000 MILLIGRAM(S): at 08:40

## 2019-05-09 RX ADMIN — Medication 1000 MILLIGRAM(S): at 16:58

## 2019-05-09 RX ADMIN — PANTOPRAZOLE SODIUM 40 MILLIGRAM(S): 20 TABLET, DELAYED RELEASE ORAL at 05:24

## 2019-05-09 RX ADMIN — OXYCODONE HYDROCHLORIDE 10 MILLIGRAM(S): 5 TABLET ORAL at 17:00

## 2019-05-09 RX ADMIN — Medication 1 SPRAY(S): at 05:24

## 2019-05-09 RX ADMIN — OXYCODONE HYDROCHLORIDE 10 MILLIGRAM(S): 5 TABLET ORAL at 23:09

## 2019-05-09 RX ADMIN — MONTELUKAST 10 MILLIGRAM(S): 4 TABLET, CHEWABLE ORAL at 12:00

## 2019-05-09 RX ADMIN — OXYCODONE HYDROCHLORIDE 10 MILLIGRAM(S): 5 TABLET ORAL at 12:30

## 2019-05-09 RX ADMIN — BUDESONIDE AND FORMOTEROL FUMARATE DIHYDRATE 2 PUFF(S): 160; 4.5 AEROSOL RESPIRATORY (INHALATION) at 18:31

## 2019-05-09 RX ADMIN — SENNA PLUS 2 TABLET(S): 8.6 TABLET ORAL at 21:10

## 2019-05-09 RX ADMIN — ENOXAPARIN SODIUM 30 MILLIGRAM(S): 100 INJECTION SUBCUTANEOUS at 21:09

## 2019-05-09 RX ADMIN — CELECOXIB 200 MILLIGRAM(S): 200 CAPSULE ORAL at 22:00

## 2019-05-09 RX ADMIN — Medication 1000 MILLIGRAM(S): at 08:07

## 2019-05-09 RX ADMIN — Medication 1000 MILLIGRAM(S): at 17:25

## 2019-05-09 RX ADMIN — Medication 1000 MILLIGRAM(S): at 02:31

## 2019-05-09 RX ADMIN — TAMSULOSIN HYDROCHLORIDE 0.4 MILLIGRAM(S): 0.4 CAPSULE ORAL at 21:10

## 2019-05-09 RX ADMIN — Medication 400 MILLIGRAM(S): at 02:30

## 2019-05-09 RX ADMIN — OXYCODONE HYDROCHLORIDE 5 MILLIGRAM(S): 5 TABLET ORAL at 08:40

## 2019-05-09 RX ADMIN — BUDESONIDE AND FORMOTEROL FUMARATE DIHYDRATE 2 PUFF(S): 160; 4.5 AEROSOL RESPIRATORY (INHALATION) at 05:25

## 2019-05-09 NOTE — OCCUPATIONAL THERAPY INITIAL EVALUATION ADULT - TRANSFER TRAINING, PT EVAL
Patient will transfer to toilet with DME as needed with minimal assistance within 2-4 sessions Patient will transfer to toilet with DME as needed Independently within 2-4 sessions

## 2019-05-09 NOTE — PROGRESS NOTE ADULT - SUBJECTIVE AND OBJECTIVE BOX
Procedure: Right TKR  POD #: 1  S: Pt without complaints. No SOB,CP, N/V. Tolerated Diet well.   Pain comfortable  on  Interval Rx.   No BM yet, + flatus, No abdominal pain.  Pain Rx:  acetaminophen   Tablet .. 1000 milliGRAM(s) Oral every 8 hours  celecoxib 200 milliGRAM(s) Oral every 12 hours  HYDROmorphone  Injectable 0.5 milliGRAM(s) IV Push every 3 hours PRN  ondansetron Injectable 4 milliGRAM(s) IV Push every 6 hours PRN  oxyCODONE    IR 5 milliGRAM(s) Oral every 3 hours PRN  oxyCODONE    IR 10 milliGRAM(s) Oral every 3 hours PRN    O: General: On exam, No Apparent Distress  Vital Signs Last 24 Hrs  T(C): 36.4 (09 May 2019 03:14), Max: 36.5 (08 May 2019 09:42)  T(F): 97.5 (09 May 2019 03:14), Max: 97.7 (08 May 2019 09:42)  HR: 58 (09 May 2019 05:26) (55 - 86)  BP: 106/64 (09 May 2019 05:26) (102/58 - 134/54)  BP(mean): --  RR: 15 (09 May 2019 05:26) (11 - 21)  SpO2: 95% (09 May 2019 05:26) (93% - 100%)    Lungs: BS clear bilat.  Heart: RRR no murmur  Abdomen: + BS, soft , benign exam     Ext -- right knee w/ dressing dry/intact  ROM: Extension 0 deg. ; Flexion 50 deg.   Neurologic:  Has sensation over feet & toes bilat. Full AROM bilat feet & toes. EHL/AT = 5/5  Vascular: Feet toes warm, pink. DP = 2+. No calf tenderness bilat..  VTEP: On Bilat. SCDs + enoxaparin Injectable 30 milliGRAM(s) SubCutaneous every 12 hours    I&O's Detail    08 May 2019 07:01  -  09 May 2019 07:00  --------------------------------------------------------  IN:    IV PiggyBack: 150 mL    lactated ringers.: 1600 mL    lactated ringers.: 375 mL    Oral Fluid: 140 mL  Total IN: 2265 mL    OUT:    Voided: 2500 mL  Total OUT: 2500 mL    Total NET: -235 mL          Activity in PT yesterday Noted. Walked 10' sidesteps w/ PT  Labs yesterday noted.  Hospitalist input noted.  Labs Today:                         13.0   x     )-----------( x        ( 08 May 2019 17:03 )             39.4       05-08    138  |  103  |  14  ----------------------------<  180<H>  3.9   |  25  |  1.07    Ca    8.9      08 May 2019 17:03        Primary Orthopedic Assessment:  • Stable from Orthopedic perspective  • Neuro motor exam stable  • Labs: CBC / Chem stable      Plan:   • Continue:  PT/OT/WBAT with assistance of a walker/Ice to knee/ Knee ROM         Incentive spirometry encouraged   • Continue DVT prophylaxis as prescribed, including use of compression devices and ankle pumps  • Continue Pain Rx  • Plans per Medicine / Anesthesia / Cardiology  • Discharge planning – anticipated discharge is Home D/C with home care & home PT vs Subacute Rehab facility when medically stable & cleared by PT/OT, after second side on Monday

## 2019-05-09 NOTE — PROGRESS NOTE ADULT - SUBJECTIVE AND OBJECTIVE BOX
INTERVAL HPI/OVERNIGHT EVENTS:   Patient seen and examined.  Eating, voiding, no BM yet.  No fevers, chills, sweats, dizziness, HA, changes in vision, cp, palpitations, sob, persistent cough, n/v/d, abd pain, dysuria, focal weakness, or calf pain.     REVIEW OF SYSTEMS:  See HPI,  all others negative    PHYSICAL EXAM:  Vital Signs Last 24 Hrs  T(C): 36.7 (09 May 2019 08:06), Max: 36.7 (09 May 2019 08:06)  T(F): 98 (09 May 2019 08:06), Max: 98 (09 May 2019 08:06)  HR: 67 (09 May 2019 08:06) (55 - 79)  BP: 126/64 (09 May 2019 08:06) (102/58 - 134/54)  BP(mean): --  RR: 18 (09 May 2019 08:06) (11 - 18)  SpO2: 94% (09 May 2019 08:06) (93% - 100%)    GENERAL: NAD, well-groomed, well-developed, awake, alert, oriented x 3, fluent and coherent speech  EYES: EOMI, PERRLA, conjunctiva and sclera clear  ENMT: No tonsillar erythema, exudates, or enlargement; Moist mucous membranes, Good dentition, No lesions  NECK: Supple, No JVD, No Cervical LAD, No thyromegaly, No thyroid nodules felt  NERVOUS SYSTEM:  Good concentration; Moving all 4 extremities; No gross sensory deficits, No facial droop  CHEST WALL: No masses  CHEST/LUNG: Clear to auscultation bilaterally; No rales, rhonchi, wheezing, or rubs  HEART: Regular rate and rhythm; No murmurs, rubs, or gallops  ABDOMEN: Soft, Nontender, Nondistended, Bowel sounds present, No palpable masses or organomegaly, No bruits  EXTREMITIES:  2+ Peripheral Pulses, No clubbing, cyanosis, or edema  INCISION: dressing c/d/i    LABS:                        11.7   13.40 )-----------( 159      ( 09 May 2019 08:06 )             35.7     09 May 2019 08:06    138    |  103    |  14     ----------------------------<  136    4.3     |  26     |  0.90     Ca    9.0        09 May 2019 08:06

## 2019-05-09 NOTE — OCCUPATIONAL THERAPY INITIAL EVALUATION ADULT - ANTICIPATED DISCHARGE DISPOSITION, OT EVAL
MUNA, ivelissea pending progress following stage 2 TKR (states he will stay at daughter initially but will be home alone and will most likely want MUNA)

## 2019-05-09 NOTE — OCCUPATIONAL THERAPY INITIAL EVALUATION ADULT - ADDITIONAL COMMENTS
Pt lives alone in an elevator apt with 3 steps to enter with no rail.  Pt has no DME Pt lives alone in an elevator apt with 3 steps to enter with no rail, + tub with curtain.  Pt has no DME.  Pt states he will stay at daughters house upon d/c from rehab (states she is  and will not be home often), Daughter lives in condo, 0 KATE, + stall shower.

## 2019-05-10 LAB
ANION GAP SERPL CALC-SCNC: 9 MMOL/L — SIGNIFICANT CHANGE UP (ref 5–17)
BUN SERPL-MCNC: 17 MG/DL — SIGNIFICANT CHANGE UP (ref 7–23)
CALCIUM SERPL-MCNC: 8.8 MG/DL — SIGNIFICANT CHANGE UP (ref 8.4–10.5)
CHLORIDE SERPL-SCNC: 105 MMOL/L — SIGNIFICANT CHANGE UP (ref 96–108)
CO2 SERPL-SCNC: 27 MMOL/L — SIGNIFICANT CHANGE UP (ref 22–31)
CREAT SERPL-MCNC: 0.99 MG/DL — SIGNIFICANT CHANGE UP (ref 0.5–1.3)
GLUCOSE SERPL-MCNC: 144 MG/DL — HIGH (ref 70–99)
HCT VFR BLD CALC: 32.3 % — LOW (ref 39–50)
HGB BLD-MCNC: 10.7 G/DL — LOW (ref 13–17)
MCHC RBC-ENTMCNC: 31.8 PG — SIGNIFICANT CHANGE UP (ref 27–34)
MCHC RBC-ENTMCNC: 33.1 GM/DL — SIGNIFICANT CHANGE UP (ref 32–36)
MCV RBC AUTO: 95.8 FL — SIGNIFICANT CHANGE UP (ref 80–100)
NRBC # BLD: 0 /100 WBCS — SIGNIFICANT CHANGE UP (ref 0–0)
PLATELET # BLD AUTO: 177 K/UL — SIGNIFICANT CHANGE UP (ref 150–400)
POTASSIUM SERPL-MCNC: 3.6 MMOL/L — SIGNIFICANT CHANGE UP (ref 3.5–5.3)
POTASSIUM SERPL-SCNC: 3.6 MMOL/L — SIGNIFICANT CHANGE UP (ref 3.5–5.3)
RBC # BLD: 3.37 M/UL — LOW (ref 4.2–5.8)
RBC # FLD: 13.2 % — SIGNIFICANT CHANGE UP (ref 10.3–14.5)
SODIUM SERPL-SCNC: 141 MMOL/L — SIGNIFICANT CHANGE UP (ref 135–145)
WBC # BLD: 10.26 K/UL — SIGNIFICANT CHANGE UP (ref 3.8–10.5)
WBC # FLD AUTO: 10.26 K/UL — SIGNIFICANT CHANGE UP (ref 3.8–10.5)

## 2019-05-10 PROCEDURE — 99232 SBSQ HOSP IP/OBS MODERATE 35: CPT

## 2019-05-10 RX ADMIN — OXYCODONE HYDROCHLORIDE 10 MILLIGRAM(S): 5 TABLET ORAL at 11:08

## 2019-05-10 RX ADMIN — OXYCODONE HYDROCHLORIDE 10 MILLIGRAM(S): 5 TABLET ORAL at 19:49

## 2019-05-10 RX ADMIN — CELECOXIB 200 MILLIGRAM(S): 200 CAPSULE ORAL at 09:36

## 2019-05-10 RX ADMIN — LOSARTAN POTASSIUM 100 MILLIGRAM(S): 100 TABLET, FILM COATED ORAL at 06:46

## 2019-05-10 RX ADMIN — OXYCODONE HYDROCHLORIDE 10 MILLIGRAM(S): 5 TABLET ORAL at 20:30

## 2019-05-10 RX ADMIN — Medication 1 SPRAY(S): at 21:23

## 2019-05-10 RX ADMIN — OXYCODONE HYDROCHLORIDE 10 MILLIGRAM(S): 5 TABLET ORAL at 10:46

## 2019-05-10 RX ADMIN — OXYCODONE HYDROCHLORIDE 10 MILLIGRAM(S): 5 TABLET ORAL at 07:51

## 2019-05-10 RX ADMIN — Medication 1000 MILLIGRAM(S): at 02:45

## 2019-05-10 RX ADMIN — BUDESONIDE AND FORMOTEROL FUMARATE DIHYDRATE 2 PUFF(S): 160; 4.5 AEROSOL RESPIRATORY (INHALATION) at 06:44

## 2019-05-10 RX ADMIN — Medication 100 MILLIGRAM(S): at 06:43

## 2019-05-10 RX ADMIN — Medication 1000 MILLIGRAM(S): at 01:42

## 2019-05-10 RX ADMIN — OXYCODONE HYDROCHLORIDE 10 MILLIGRAM(S): 5 TABLET ORAL at 23:09

## 2019-05-10 RX ADMIN — SENNA PLUS 2 TABLET(S): 8.6 TABLET ORAL at 21:24

## 2019-05-10 RX ADMIN — OXYCODONE HYDROCHLORIDE 10 MILLIGRAM(S): 5 TABLET ORAL at 15:54

## 2019-05-10 RX ADMIN — ENOXAPARIN SODIUM 30 MILLIGRAM(S): 100 INJECTION SUBCUTANEOUS at 09:35

## 2019-05-10 RX ADMIN — ENOXAPARIN SODIUM 30 MILLIGRAM(S): 100 INJECTION SUBCUTANEOUS at 21:23

## 2019-05-10 RX ADMIN — OXYCODONE HYDROCHLORIDE 10 MILLIGRAM(S): 5 TABLET ORAL at 00:10

## 2019-05-10 RX ADMIN — OXYCODONE HYDROCHLORIDE 10 MILLIGRAM(S): 5 TABLET ORAL at 07:52

## 2019-05-10 RX ADMIN — Medication 1000 MILLIGRAM(S): at 16:37

## 2019-05-10 RX ADMIN — Medication 1000 MILLIGRAM(S): at 07:52

## 2019-05-10 RX ADMIN — Medication 1 SPRAY(S): at 06:43

## 2019-05-10 RX ADMIN — CELECOXIB 200 MILLIGRAM(S): 200 CAPSULE ORAL at 21:23

## 2019-05-10 RX ADMIN — PANTOPRAZOLE SODIUM 40 MILLIGRAM(S): 20 TABLET, DELAYED RELEASE ORAL at 06:43

## 2019-05-10 RX ADMIN — OXYCODONE HYDROCHLORIDE 10 MILLIGRAM(S): 5 TABLET ORAL at 23:40

## 2019-05-10 RX ADMIN — TAMSULOSIN HYDROCHLORIDE 0.4 MILLIGRAM(S): 0.4 CAPSULE ORAL at 21:23

## 2019-05-10 RX ADMIN — CELECOXIB 200 MILLIGRAM(S): 200 CAPSULE ORAL at 09:35

## 2019-05-10 RX ADMIN — BUDESONIDE AND FORMOTEROL FUMARATE DIHYDRATE 2 PUFF(S): 160; 4.5 AEROSOL RESPIRATORY (INHALATION) at 21:23

## 2019-05-10 RX ADMIN — CELECOXIB 200 MILLIGRAM(S): 200 CAPSULE ORAL at 21:28

## 2019-05-10 RX ADMIN — AMLODIPINE BESYLATE 10 MILLIGRAM(S): 2.5 TABLET ORAL at 06:46

## 2019-05-10 RX ADMIN — MONTELUKAST 10 MILLIGRAM(S): 4 TABLET, CHEWABLE ORAL at 10:46

## 2019-05-10 RX ADMIN — Medication 100 MILLIGRAM(S): at 11:23

## 2019-05-10 RX ADMIN — Medication 100 MILLIGRAM(S): at 21:23

## 2019-05-10 RX ADMIN — OXYCODONE HYDROCHLORIDE 10 MILLIGRAM(S): 5 TABLET ORAL at 15:37

## 2019-05-10 NOTE — PROGRESS NOTE ADULT - SUBJECTIVE AND OBJECTIVE BOX
Procedure: Right  TKR  POD #: 2  S: Pt without complaints. No SOB,CP, N/V. Tolerated Diet well.   Pain comfortable  on  Interval Rx.   No BM yet, + flatus, No abdominal pain.  Pain Rx:  acetaminophen   Tablet .. 1000 milliGRAM(s) Oral every 8 hours  celecoxib 200 milliGRAM(s) Oral every 12 hours  ondansetron Injectable 4 milliGRAM(s) IV Push every 6 hours PRN  oxyCODONE    IR 5 milliGRAM(s) Oral every 3 hours PRN  oxyCODONE    IR 10 milliGRAM(s) Oral every 3 hours PRN    O: General: On exam, No Apparent Distress  Vital Signs Last 24 Hrs  T(C): 36.7 (10 May 2019 07:57), Max: 36.9 (09 May 2019 19:01)  T(F): 98.1 (10 May 2019 07:57), Max: 98.5 (09 May 2019 19:01)  HR: 67 (10 May 2019 07:57) (51 - 67)  BP: 159/83 (10 May 2019 07:57) (115/58 - 159/83)  BP(mean): --  RR: 16 (10 May 2019 07:57) (15 - 16)  SpO2: 93% (10 May 2019 07:57) (91% - 96%)    Lungs: BS clear bilat.  Heart: RRR no murmur  Abdomen: + BS, soft , benign exam     Ext --right knee dressing changed. Prineo intact, wound dry.  Redressed w/ gauze and spandage  ROM: Extension 0 deg. ; Flexion 95 deg.   Exrcises reviewed and performed by pt.  Neurologic:  Has sensation over feet & toes bilat. Full AROM bilat feet & toes. EHL/AT = 5/5  Vascular: Feet toes warm, pink. DP = 2+. No calf tenderness bilat..  VTEP: On Bilat. Venodynes + enoxaparin Injectable 30 milliGRAM(s) SubCutaneous every 12 hours    I&O's Detail    09 May 2019 07:01  -  10 May 2019 07:00  --------------------------------------------------------  IN:    Oral Fluid: 120 mL  Total IN: 120 mL    OUT:    Voided: 850 mL  Total OUT: 850 mL    Total NET: -730 mL          Activity in PT yesterday Noted. Walked 75'  Labs yesterday noted.  Hospitalist input noted.  Labs today --pending                        11.7   13.40 )-----------( 159      ( 09 May 2019 08:06 )             35.7       05-09    138  |  103  |  14  ----------------------------<  136<H>  4.3   |  26  |  0.90    Ca    9.0      09 May 2019 08:06        Primary Orthopedic Assessment:  • Stable from Orthopedic perspective  • Neuro motor exam stable  • Labs: CBC / Chem stable      Plan:   • Continue:  PT/OT/WBAT with assistance of a walker/Ice to knee/ Knee ROM         Incentive spirometry encouraged   • Continue DVT prophylaxis as prescribed, including use of compression devices and ankle pumps  • Continue Pain Rx  • Plans per Medicine / Anesthesia / Cardiology  • Discharge planning – anticipated discharge is Home D/C with home care & home PT vs  Subacute Rehab facility when medically stable & cleared by PT/OT, after second side.  Pt prefers to go to rehab due to living alone

## 2019-05-10 NOTE — PROGRESS NOTE ADULT - SUBJECTIVE AND OBJECTIVE BOX
INTERVAL HPI/OVERNIGHT EVENTS:   Patient seen and examined.  Eating, voiding, no BM yet.  No fevers, chills, sweats, dizziness, HA, changes in vision, cp, palpitations, sob, persistent cough, n/v/d, abd pain, dysuria, focal weakness, or calf pain.     REVIEW OF SYSTEMS:  See HPI,  all others negative    PHYSICAL EXAM:  Vital Signs Last 24 Hrs  T(C): 36.7 (10 May 2019 07:57), Max: 36.9 (09 May 2019 19:01)  T(F): 98.1 (10 May 2019 07:57), Max: 98.5 (09 May 2019 19:01)  HR: 67 (10 May 2019 07:57) (51 - 67)  BP: 159/83 (10 May 2019 07:57) (115/58 - 159/83)  BP(mean): --  RR: 16 (10 May 2019 07:57) (15 - 16)  SpO2: 93% (10 May 2019 07:57) (91% - 96%)    GENERAL: NAD, well-groomed, well-developed, awake, alert, oriented x 3, fluent and coherent speech  EYES: EOMI, PERRLA, conjunctiva and sclera clear  ENMT: No tonsillar erythema, exudates, or enlargement; Moist mucous membranes,  No lesions  NECK: Supple, No JVD, No Cervical LAD   NERVOUS SYSTEM:  Good concentration; Moving all 4 extremities; No gross sensory deficits, No facial droop  CHEST WALL: No masses  CHEST/LUNG: Clear to auscultation bilaterally; No rales, rhonchi, wheezing, or rubs  HEART: Regular rate and rhythm; No murmurs, rubs, or gallops  ABDOMEN: Soft, Nontender, Nondistended, Bowel sounds present, No palpable masses or organomegaly, No bruits  EXTREMITIES:  2+ Peripheral Pulses, No clubbing, cyanosis, or edema  INCISION: dressing c/d/i    LABS:                                   10.7   10.26 )-----------( 177      ( 10 May 2019 08:44 )             32.3     05-10    141  |  105  |  17  ----------------------------<  144<H>  3.6   |  27  |  0.99    Ca    8.8      10 May 2019 08:44

## 2019-05-11 PROCEDURE — 93970 EXTREMITY STUDY: CPT | Mod: 26

## 2019-05-11 PROCEDURE — 99232 SBSQ HOSP IP/OBS MODERATE 35: CPT

## 2019-05-11 RX ADMIN — OXYCODONE HYDROCHLORIDE 10 MILLIGRAM(S): 5 TABLET ORAL at 19:24

## 2019-05-11 RX ADMIN — OXYCODONE HYDROCHLORIDE 10 MILLIGRAM(S): 5 TABLET ORAL at 09:08

## 2019-05-11 RX ADMIN — CELECOXIB 200 MILLIGRAM(S): 200 CAPSULE ORAL at 09:07

## 2019-05-11 RX ADMIN — Medication 1 SPRAY(S): at 09:07

## 2019-05-11 RX ADMIN — POLYETHYLENE GLYCOL 3350 17 GRAM(S): 17 POWDER, FOR SOLUTION ORAL at 19:24

## 2019-05-11 RX ADMIN — Medication 100 MILLIGRAM(S): at 12:47

## 2019-05-11 RX ADMIN — OXYCODONE HYDROCHLORIDE 10 MILLIGRAM(S): 5 TABLET ORAL at 13:40

## 2019-05-11 RX ADMIN — BUDESONIDE AND FORMOTEROL FUMARATE DIHYDRATE 2 PUFF(S): 160; 4.5 AEROSOL RESPIRATORY (INHALATION) at 20:44

## 2019-05-11 RX ADMIN — OXYCODONE HYDROCHLORIDE 10 MILLIGRAM(S): 5 TABLET ORAL at 17:15

## 2019-05-11 RX ADMIN — TAMSULOSIN HYDROCHLORIDE 0.4 MILLIGRAM(S): 0.4 CAPSULE ORAL at 20:43

## 2019-05-11 RX ADMIN — Medication 100 MILLIGRAM(S): at 05:14

## 2019-05-11 RX ADMIN — CELECOXIB 200 MILLIGRAM(S): 200 CAPSULE ORAL at 09:06

## 2019-05-11 RX ADMIN — CELECOXIB 200 MILLIGRAM(S): 200 CAPSULE ORAL at 20:44

## 2019-05-11 RX ADMIN — Medication 1000 MILLIGRAM(S): at 16:31

## 2019-05-11 RX ADMIN — Medication 1000 MILLIGRAM(S): at 09:06

## 2019-05-11 RX ADMIN — ENOXAPARIN SODIUM 30 MILLIGRAM(S): 100 INJECTION SUBCUTANEOUS at 20:43

## 2019-05-11 RX ADMIN — Medication 1000 MILLIGRAM(S): at 09:07

## 2019-05-11 RX ADMIN — PANTOPRAZOLE SODIUM 40 MILLIGRAM(S): 20 TABLET, DELAYED RELEASE ORAL at 05:14

## 2019-05-11 RX ADMIN — BUDESONIDE AND FORMOTEROL FUMARATE DIHYDRATE 2 PUFF(S): 160; 4.5 AEROSOL RESPIRATORY (INHALATION) at 09:07

## 2019-05-11 RX ADMIN — SENNA PLUS 2 TABLET(S): 8.6 TABLET ORAL at 20:43

## 2019-05-11 RX ADMIN — OXYCODONE HYDROCHLORIDE 10 MILLIGRAM(S): 5 TABLET ORAL at 20:09

## 2019-05-11 RX ADMIN — OXYCODONE HYDROCHLORIDE 10 MILLIGRAM(S): 5 TABLET ORAL at 23:50

## 2019-05-11 RX ADMIN — OXYCODONE HYDROCHLORIDE 10 MILLIGRAM(S): 5 TABLET ORAL at 09:53

## 2019-05-11 RX ADMIN — Medication 1 SPRAY(S): at 20:44

## 2019-05-11 RX ADMIN — Medication 1000 MILLIGRAM(S): at 00:11

## 2019-05-11 RX ADMIN — OXYCODONE HYDROCHLORIDE 10 MILLIGRAM(S): 5 TABLET ORAL at 22:58

## 2019-05-11 RX ADMIN — MAGNESIUM HYDROXIDE 30 MILLILITER(S): 400 TABLET, CHEWABLE ORAL at 12:42

## 2019-05-11 RX ADMIN — MONTELUKAST 10 MILLIGRAM(S): 4 TABLET, CHEWABLE ORAL at 12:42

## 2019-05-11 RX ADMIN — CELECOXIB 200 MILLIGRAM(S): 200 CAPSULE ORAL at 20:43

## 2019-05-11 RX ADMIN — OXYCODONE HYDROCHLORIDE 10 MILLIGRAM(S): 5 TABLET ORAL at 16:31

## 2019-05-11 RX ADMIN — ENOXAPARIN SODIUM 30 MILLIGRAM(S): 100 INJECTION SUBCUTANEOUS at 09:06

## 2019-05-11 RX ADMIN — OXYCODONE HYDROCHLORIDE 10 MILLIGRAM(S): 5 TABLET ORAL at 12:42

## 2019-05-11 RX ADMIN — Medication 100 MILLIGRAM(S): at 20:43

## 2019-05-11 NOTE — PROGRESS NOTE ADULT - SUBJECTIVE AND OBJECTIVE BOX
CC.  S/P right Total knee replacement    HPI.  Patient reports right knee pain is controlled.  Offers no other complaints                Constitutional: No fever, fatigue or weight loss.  Skin: No rash.  Eyes: No recent vision problems or eye pain.  ENT: No congestion, ear pain, or sore throat.  Endocrine: No thyroid problems.  Cardiovascular: No chest pain or palpation.  Respiratory: No cough, shortness of breath, congestion, or wheezing.  Gastrointestinal: No abdominal pain, nausea, vomiting, or diarrhea.  Genitourinary: No dysuria.  Musculoskeletal: No joint swelling.  Neurologic: No headache.      Vital Signs Last 24 Hrs  T(C): 36.9 (05-11-19 @ 07:41), Max: 36.9 (05-11-19 @ 07:41)  T(F): 98.4 (05-11-19 @ 07:41), Max: 98.4 (05-11-19 @ 07:41)  HR: 67 (05-11-19 @ 07:41) (57 - 71)  BP: 126/72 (05-11-19 @ 07:41) (101/64 - 146/68)  BP(mean): --  RR: 18 (05-11-19 @ 07:41) (16 - 18)  SpO2: 95% (05-11-19 @ 07:41) (94% - 95%)        PHYSICAL EXAM-  GENERAL: NAD, well-groomed, well-developed  HEAD:  Atraumatic, Normocephalic  EYES: EOMI, PERRLA, conjunctiva and sclera clear  NECK: Supple, No JVD, Normal thyroid  NERVOUS SYSTEM:  Alert & Oriented X3, Motor Strength 5/5 B/L upper and lower extremities; DTRs 2+ intact and symmetric  CHEST/LUNG: Clear to percussion bilaterally; No rales, rhonchi, wheezing, or rubs  HEART: Regular rate and rhythm; No murmurs, rubs, or gallops  ABDOMEN: Soft, Nontender, Nondistended; Bowel sounds present  EXTREMITIES:  2+ Peripheral Pulses, No clubbing, cyanosis, or edema  SKIN: No rashes or lesions                                  10.7   10.26 )-----------( 177      ( 10 May 2019 08:44 )             32.3     05-10    141  |  105  |  17  ----------------------------<  144<H>  3.6   |  27  |  0.99    Ca    8.8      10 May 2019 08:44                      MEDICATIONS  (STANDING):  acetaminophen   Tablet .. 1000 milliGRAM(s) Oral every 8 hours  amLODIPine   Tablet 10 milliGRAM(s) Oral daily  buDESOnide 160 MICROgram(s)/formoterol 4.5 MICROgram(s) Inhaler 2 Puff(s) Inhalation two times a day  celecoxib 200 milliGRAM(s) Oral every 12 hours  docusate sodium 100 milliGRAM(s) Oral three times a day  enoxaparin Injectable 30 milliGRAM(s) SubCutaneous every 12 hours  fluticasone propionate 50 MICROgram(s)/spray Nasal Spray 1 Spray(s) Both Nostrils two times a day  losartan 100 milliGRAM(s) Oral daily  montelukast 10 milliGRAM(s) Oral daily  pantoprazole    Tablet 40 milliGRAM(s) Oral before breakfast  senna 2 Tablet(s) Oral at bedtime  tamsulosin 0.4 milliGRAM(s) Oral at bedtime    MEDICATIONS  (PRN):  ALBUTerol    90 MICROgram(s) HFA Inhaler 2 Puff(s) Inhalation every 6 hours PRN for allergy symptoms  aluminum hydroxide/magnesium hydroxide/simethicone Suspension 30 milliLiter(s) Oral four times a day PRN Indigestion  bisacodyl Suppository 10 milliGRAM(s) Rectal daily PRN If no bowel movement by postoperative day #2  HYDROmorphone  Injectable 0.5 milliGRAM(s) IV Push every 3 hours PRN Severe Pain (7 - 10)  magnesium hydroxide Suspension 30 milliLiter(s) Oral daily PRN Constipation  ondansetron Injectable 4 milliGRAM(s) IV Push every 6 hours PRN Nausea and/or Vomiting  oxyCODONE    IR 5 milliGRAM(s) Oral every 3 hours PRN Mild Pain (1 - 3)  oxyCODONE    IR 10 milliGRAM(s) Oral every 3 hours PRN Moderate Pain (4 - 6)  polyethylene glycol 3350 17 Gram(s) Oral daily PRN Constipation           Imaging Personally Reviewed:     [x ] YES  [ ] NO    Consultant(s) Notes Reviewed:  [x ] YES  [ ] NO    Care Discussed with Consultants/Other Providers [x ] YES  [ ] No medical contraindication for discharge

## 2019-05-12 LAB
ANION GAP SERPL CALC-SCNC: 6 MMOL/L — SIGNIFICANT CHANGE UP (ref 5–17)
APTT BLD: 33.2 SEC — SIGNIFICANT CHANGE UP (ref 28.5–37)
BUN SERPL-MCNC: 14 MG/DL — SIGNIFICANT CHANGE UP (ref 7–23)
CALCIUM SERPL-MCNC: 9 MG/DL — SIGNIFICANT CHANGE UP (ref 8.4–10.5)
CHLORIDE SERPL-SCNC: 105 MMOL/L — SIGNIFICANT CHANGE UP (ref 96–108)
CO2 SERPL-SCNC: 30 MMOL/L — SIGNIFICANT CHANGE UP (ref 22–31)
CREAT SERPL-MCNC: 0.97 MG/DL — SIGNIFICANT CHANGE UP (ref 0.5–1.3)
GLUCOSE SERPL-MCNC: 110 MG/DL — HIGH (ref 70–99)
HCT VFR BLD CALC: 31.4 % — LOW (ref 39–50)
HGB BLD-MCNC: 10.2 G/DL — LOW (ref 13–17)
INR BLD: 1.12 RATIO — SIGNIFICANT CHANGE UP (ref 0.88–1.16)
MCHC RBC-ENTMCNC: 31.8 PG — SIGNIFICANT CHANGE UP (ref 27–34)
MCHC RBC-ENTMCNC: 32.5 GM/DL — SIGNIFICANT CHANGE UP (ref 32–36)
MCV RBC AUTO: 97.8 FL — SIGNIFICANT CHANGE UP (ref 80–100)
NRBC # BLD: 0 /100 WBCS — SIGNIFICANT CHANGE UP (ref 0–0)
PLATELET # BLD AUTO: 178 K/UL — SIGNIFICANT CHANGE UP (ref 150–400)
POTASSIUM SERPL-MCNC: 4.2 MMOL/L — SIGNIFICANT CHANGE UP (ref 3.5–5.3)
POTASSIUM SERPL-SCNC: 4.2 MMOL/L — SIGNIFICANT CHANGE UP (ref 3.5–5.3)
PROTHROM AB SERPL-ACNC: 12.2 SEC — SIGNIFICANT CHANGE UP (ref 10–12.9)
RBC # BLD: 3.21 M/UL — LOW (ref 4.2–5.8)
RBC # FLD: 13.1 % — SIGNIFICANT CHANGE UP (ref 10.3–14.5)
SODIUM SERPL-SCNC: 141 MMOL/L — SIGNIFICANT CHANGE UP (ref 135–145)
WBC # BLD: 8.4 K/UL — SIGNIFICANT CHANGE UP (ref 3.8–10.5)
WBC # FLD AUTO: 8.4 K/UL — SIGNIFICANT CHANGE UP (ref 3.8–10.5)

## 2019-05-12 PROCEDURE — 99232 SBSQ HOSP IP/OBS MODERATE 35: CPT

## 2019-05-12 PROCEDURE — 12345: CPT | Mod: NC

## 2019-05-12 RX ADMIN — Medication 100 MILLIGRAM(S): at 13:18

## 2019-05-12 RX ADMIN — Medication 1000 MILLIGRAM(S): at 17:22

## 2019-05-12 RX ADMIN — AMLODIPINE BESYLATE 10 MILLIGRAM(S): 2.5 TABLET ORAL at 06:35

## 2019-05-12 RX ADMIN — MONTELUKAST 10 MILLIGRAM(S): 4 TABLET, CHEWABLE ORAL at 13:18

## 2019-05-12 RX ADMIN — BUDESONIDE AND FORMOTEROL FUMARATE DIHYDRATE 2 PUFF(S): 160; 4.5 AEROSOL RESPIRATORY (INHALATION) at 06:24

## 2019-05-12 RX ADMIN — MAGNESIUM HYDROXIDE 30 MILLILITER(S): 400 TABLET, CHEWABLE ORAL at 06:23

## 2019-05-12 RX ADMIN — SENNA PLUS 2 TABLET(S): 8.6 TABLET ORAL at 21:13

## 2019-05-12 RX ADMIN — Medication 1000 MILLIGRAM(S): at 23:52

## 2019-05-12 RX ADMIN — Medication 100 MILLIGRAM(S): at 21:13

## 2019-05-12 RX ADMIN — OXYCODONE HYDROCHLORIDE 10 MILLIGRAM(S): 5 TABLET ORAL at 17:57

## 2019-05-12 RX ADMIN — Medication 10 MILLIGRAM(S): at 09:13

## 2019-05-12 RX ADMIN — OXYCODONE HYDROCHLORIDE 10 MILLIGRAM(S): 5 TABLET ORAL at 06:24

## 2019-05-12 RX ADMIN — Medication 1000 MILLIGRAM(S): at 09:10

## 2019-05-12 RX ADMIN — CELECOXIB 200 MILLIGRAM(S): 200 CAPSULE ORAL at 09:09

## 2019-05-12 RX ADMIN — OXYCODONE HYDROCHLORIDE 10 MILLIGRAM(S): 5 TABLET ORAL at 14:30

## 2019-05-12 RX ADMIN — OXYCODONE HYDROCHLORIDE 10 MILLIGRAM(S): 5 TABLET ORAL at 07:02

## 2019-05-12 RX ADMIN — LOSARTAN POTASSIUM 100 MILLIGRAM(S): 100 TABLET, FILM COATED ORAL at 06:35

## 2019-05-12 RX ADMIN — CELECOXIB 200 MILLIGRAM(S): 200 CAPSULE ORAL at 09:11

## 2019-05-12 RX ADMIN — OXYCODONE HYDROCHLORIDE 10 MILLIGRAM(S): 5 TABLET ORAL at 17:22

## 2019-05-12 RX ADMIN — PANTOPRAZOLE SODIUM 40 MILLIGRAM(S): 20 TABLET, DELAYED RELEASE ORAL at 06:23

## 2019-05-12 RX ADMIN — OXYCODONE HYDROCHLORIDE 10 MILLIGRAM(S): 5 TABLET ORAL at 20:30

## 2019-05-12 RX ADMIN — Medication 1000 MILLIGRAM(S): at 17:23

## 2019-05-12 RX ADMIN — OXYCODONE HYDROCHLORIDE 10 MILLIGRAM(S): 5 TABLET ORAL at 13:18

## 2019-05-12 RX ADMIN — Medication 1 SPRAY(S): at 06:23

## 2019-05-12 RX ADMIN — OXYCODONE HYDROCHLORIDE 10 MILLIGRAM(S): 5 TABLET ORAL at 23:30

## 2019-05-12 RX ADMIN — Medication 1000 MILLIGRAM(S): at 00:07

## 2019-05-12 RX ADMIN — Medication 100 MILLIGRAM(S): at 06:23

## 2019-05-12 RX ADMIN — Medication 1 SPRAY(S): at 17:22

## 2019-05-12 RX ADMIN — TAMSULOSIN HYDROCHLORIDE 0.4 MILLIGRAM(S): 0.4 CAPSULE ORAL at 21:13

## 2019-05-12 RX ADMIN — OXYCODONE HYDROCHLORIDE 10 MILLIGRAM(S): 5 TABLET ORAL at 21:00

## 2019-05-12 RX ADMIN — BUDESONIDE AND FORMOTEROL FUMARATE DIHYDRATE 2 PUFF(S): 160; 4.5 AEROSOL RESPIRATORY (INHALATION) at 19:00

## 2019-05-12 RX ADMIN — Medication 1000 MILLIGRAM(S): at 23:53

## 2019-05-12 RX ADMIN — ENOXAPARIN SODIUM 30 MILLIGRAM(S): 100 INJECTION SUBCUTANEOUS at 09:10

## 2019-05-12 NOTE — PROGRESS NOTE ADULT - SUBJECTIVE AND OBJECTIVE BOX
ISIS REAL                                                               [id number]                                                       Allergies---No Known Allergies        Pt is a 73y year old Male s/p right TKR.   Pt. is A&O x 3, resting comfortably, with no complaints.   Pain is 1/10.   Tolerating the diet.   Denies chest pain / shortness of breath / dyspnea / nausea / vomiting / headaches or light headed ness.         Vital Signs Last 24 Hrs  T(F): 98.6 (05-12-19 @ 07:58), Max: 98.6 (05-12-19 @ 07:58)  HR: 61 (05-12-19 @ 07:58)  BP: 106/67 (05-12-19 @ 07:58)  RR: 17 (05-12-19 @ 07:58)  SpO2: 97% (05-12-19 @ 07:58)    I&O's Detail    11 May 2019 07:01  -  12 May 2019 07:00  --------------------------------------------------------  IN:  Total IN: 0 mL    OUT:    Voided: 900 mL  Total OUT: 900 mL    Total NET: -900 mL        PE:   Right Lower Extremity:   Dressing is C/D/I.   Dressing changed.   Incision is clean and dry.   The wound is closed with prineo.   No redness, swelling, heat, discharge or other evidence of infection, superficial or deep.   Neurovascularly intact.   No gross evidence of motor or sensory deficit.   +2 DP/PT pulses.   EHL/FHL/TA intact.   Toes are pink and mobile.   Capillary refill < 2 seconds.   Negative calf tenderness.   PAS on.      Left knee:   Skin is C/D/I   no redness, swelling, heat or discharge   NVI   good distal pulses                               10.2   8.40  )--------------( 178                          05-12-19 @ 06:49               31.4         141   |  105  |  14  -----------------------------<  110                  05-12-19 @ 06:49  4.2    |  30    |  0.97        Ca    9.0              A:   Pt is a 73y year old Male S/P right total knee replacement, Post Op Day #4        Plan:    - Follow up with Medicine    - OOB with PT/OT   - Pain control    - STAGE 2- left total knee replacement in am   - DVT ppx = PAS +                                                                                                                                                                             Corby Cifuentes RPA-C

## 2019-05-12 NOTE — PROGRESS NOTE ADULT - SUBJECTIVE AND OBJECTIVE BOX
CC.  S/P right Total knee replacement    HPI.  Patient reports right knee pain is controlled.  Offers no other complaints                Constitutional: No fever, fatigue or weight loss.  Skin: No rash.  Eyes: No recent vision problems or eye pain.  ENT: No congestion, ear pain, or sore throat.  Endocrine: No thyroid problems.  Cardiovascular: No chest pain or palpation.  Respiratory: No cough, shortness of breath, congestion, or wheezing.  Gastrointestinal: No abdominal pain, nausea, vomiting, or diarrhea.  Genitourinary: No dysuria.  Musculoskeletal: No joint swelling.  Neurologic: No headache.      Vital Signs Last 24 Hrs  T(C): 37 (12 May 2019 07:58), Max: 37 (12 May 2019 07:58)  T(F): 98.6 (12 May 2019 07:58), Max: 98.6 (12 May 2019 07:58)  HR: 61 (12 May 2019 07:58) (60 - 78)  BP: 106/67 (12 May 2019 07:58) (106/67 - 147/67)  BP(mean): --  RR: 17 (12 May 2019 07:58) (16 - 17)  SpO2: 97% (12 May 2019 07:58) (92% - 97%)        PHYSICAL EXAM-  GENERAL: NAD, well-groomed, well-developed  HEAD:  Atraumatic, Normocephalic  EYES: EOMI, PERRLA, conjunctiva and sclera clear  NECK: Supple, No JVD, Normal thyroid  NERVOUS SYSTEM:  Alert & Oriented X3, Motor Strength 5/5 B/L upper and lower extremities; DTRs 2+ intact and symmetric  CHEST/LUNG: Clear to percussion bilaterally; No rales, rhonchi, wheezing, or rubs  HEART: Regular rate and rhythm; No murmurs, rubs, or gallops  ABDOMEN: Soft, Nontender, Nondistended; Bowel sounds present  EXTREMITIES:  2+ Peripheral Pulses, No clubbing, cyanosis, or edema  SKIN: No rashes or lesions                                  10.2   8.40  )-----------( 178      ( 12 May 2019 06:49 )             31.4     05-12    141  |  105  |  14  ----------------------------<  110<H>  4.2   |  30  |  0.97    Ca    9.0      12 May 2019 06:49              PT/INR - ( 12 May 2019 06:49 )   PT: 12.2 sec;   INR: 1.12 ratio         PTT - ( 12 May 2019 06:49 )  PTT:33.2 sec                MEDICATIONS  (STANDING):  acetaminophen   Tablet .. 1000 milliGRAM(s) Oral every 8 hours  amLODIPine   Tablet 10 milliGRAM(s) Oral daily  buDESOnide 160 MICROgram(s)/formoterol 4.5 MICROgram(s) Inhaler 2 Puff(s) Inhalation two times a day  celecoxib 200 milliGRAM(s) Oral every 12 hours  docusate sodium 100 milliGRAM(s) Oral three times a day  enoxaparin Injectable 30 milliGRAM(s) SubCutaneous every 12 hours  fluticasone propionate 50 MICROgram(s)/spray Nasal Spray 1 Spray(s) Both Nostrils two times a day  losartan 100 milliGRAM(s) Oral daily  montelukast 10 milliGRAM(s) Oral daily  pantoprazole    Tablet 40 milliGRAM(s) Oral before breakfast  senna 2 Tablet(s) Oral at bedtime  tamsulosin 0.4 milliGRAM(s) Oral at bedtime    MEDICATIONS  (PRN):  ALBUTerol    90 MICROgram(s) HFA Inhaler 2 Puff(s) Inhalation every 6 hours PRN for allergy symptoms  aluminum hydroxide/magnesium hydroxide/simethicone Suspension 30 milliLiter(s) Oral four times a day PRN Indigestion  bisacodyl Suppository 10 milliGRAM(s) Rectal daily PRN If no bowel movement by postoperative day #2  HYDROmorphone  Injectable 0.5 milliGRAM(s) IV Push every 3 hours PRN Severe Pain (7 - 10)  magnesium hydroxide Suspension 30 milliLiter(s) Oral daily PRN Constipation  ondansetron Injectable 4 milliGRAM(s) IV Push every 6 hours PRN Nausea and/or Vomiting  oxyCODONE    IR 5 milliGRAM(s) Oral every 3 hours PRN Mild Pain (1 - 3)  oxyCODONE    IR 10 milliGRAM(s) Oral every 3 hours PRN Moderate Pain (4 - 6)  polyethylene glycol 3350 17 Gram(s) Oral daily PRN Constipation           Imaging Personally Reviewed:     [x ] YES  [ ] NO    Consultant(s) Notes Reviewed:  [x ] YES  [ ] NO    Care Discussed with Consultants/Other Providers [x ] YES  [ ] No medical contraindication for discharge

## 2019-05-13 ENCOUNTER — RESULT REVIEW (OUTPATIENT)
Age: 74
End: 2019-05-13

## 2019-05-13 ENCOUNTER — APPOINTMENT (OUTPATIENT)
Dept: ORTHOPEDIC SURGERY | Facility: HOSPITAL | Age: 74
End: 2019-05-13

## 2019-05-13 ENCOUNTER — TRANSCRIPTION ENCOUNTER (OUTPATIENT)
Age: 74
End: 2019-05-13

## 2019-05-13 LAB
ANION GAP SERPL CALC-SCNC: 4 MMOL/L — LOW (ref 5–17)
ANION GAP SERPL CALC-SCNC: 9 MMOL/L — SIGNIFICANT CHANGE UP (ref 5–17)
BUN SERPL-MCNC: 13 MG/DL — SIGNIFICANT CHANGE UP (ref 7–23)
BUN SERPL-MCNC: 15 MG/DL — SIGNIFICANT CHANGE UP (ref 7–23)
CALCIUM SERPL-MCNC: 8.9 MG/DL — SIGNIFICANT CHANGE UP (ref 8.4–10.5)
CALCIUM SERPL-MCNC: 9.2 MG/DL — SIGNIFICANT CHANGE UP (ref 8.4–10.5)
CHLORIDE SERPL-SCNC: 104 MMOL/L — SIGNIFICANT CHANGE UP (ref 96–108)
CHLORIDE SERPL-SCNC: 105 MMOL/L — SIGNIFICANT CHANGE UP (ref 96–108)
CO2 SERPL-SCNC: 28 MMOL/L — SIGNIFICANT CHANGE UP (ref 22–31)
CO2 SERPL-SCNC: 31 MMOL/L — SIGNIFICANT CHANGE UP (ref 22–31)
CREAT SERPL-MCNC: 0.98 MG/DL — SIGNIFICANT CHANGE UP (ref 0.5–1.3)
CREAT SERPL-MCNC: 1.04 MG/DL — SIGNIFICANT CHANGE UP (ref 0.5–1.3)
GLUCOSE SERPL-MCNC: 148 MG/DL — HIGH (ref 70–99)
GLUCOSE SERPL-MCNC: 225 MG/DL — HIGH (ref 70–99)
HCT VFR BLD CALC: 30.1 % — LOW (ref 39–50)
HCT VFR BLD CALC: 31.7 % — LOW (ref 39–50)
HGB BLD-MCNC: 10.4 G/DL — LOW (ref 13–17)
HGB BLD-MCNC: 9.6 G/DL — LOW (ref 13–17)
MCHC RBC-ENTMCNC: 30.9 PG — SIGNIFICANT CHANGE UP (ref 27–34)
MCHC RBC-ENTMCNC: 31.9 GM/DL — LOW (ref 32–36)
MCHC RBC-ENTMCNC: 32 PG — SIGNIFICANT CHANGE UP (ref 27–34)
MCHC RBC-ENTMCNC: 32.8 GM/DL — SIGNIFICANT CHANGE UP (ref 32–36)
MCV RBC AUTO: 96.8 FL — SIGNIFICANT CHANGE UP (ref 80–100)
MCV RBC AUTO: 97.5 FL — SIGNIFICANT CHANGE UP (ref 80–100)
NRBC # BLD: 0 /100 WBCS — SIGNIFICANT CHANGE UP (ref 0–0)
NRBC # BLD: 0 /100 WBCS — SIGNIFICANT CHANGE UP (ref 0–0)
PLATELET # BLD AUTO: 214 K/UL — SIGNIFICANT CHANGE UP (ref 150–400)
PLATELET # BLD AUTO: 233 K/UL — SIGNIFICANT CHANGE UP (ref 150–400)
POTASSIUM SERPL-MCNC: 3.8 MMOL/L — SIGNIFICANT CHANGE UP (ref 3.5–5.3)
POTASSIUM SERPL-MCNC: 4.4 MMOL/L — SIGNIFICANT CHANGE UP (ref 3.5–5.3)
POTASSIUM SERPL-SCNC: 3.8 MMOL/L — SIGNIFICANT CHANGE UP (ref 3.5–5.3)
POTASSIUM SERPL-SCNC: 4.4 MMOL/L — SIGNIFICANT CHANGE UP (ref 3.5–5.3)
RBC # BLD: 3.11 M/UL — LOW (ref 4.2–5.8)
RBC # BLD: 3.25 M/UL — LOW (ref 4.2–5.8)
RBC # FLD: 13.2 % — SIGNIFICANT CHANGE UP (ref 10.3–14.5)
RBC # FLD: 13.2 % — SIGNIFICANT CHANGE UP (ref 10.3–14.5)
SODIUM SERPL-SCNC: 140 MMOL/L — SIGNIFICANT CHANGE UP (ref 135–145)
SODIUM SERPL-SCNC: 141 MMOL/L — SIGNIFICANT CHANGE UP (ref 135–145)
WBC # BLD: 10.52 K/UL — HIGH (ref 3.8–10.5)
WBC # BLD: 7.17 K/UL — SIGNIFICANT CHANGE UP (ref 3.8–10.5)
WBC # FLD AUTO: 10.52 K/UL — HIGH (ref 3.8–10.5)
WBC # FLD AUTO: 7.17 K/UL — SIGNIFICANT CHANGE UP (ref 3.8–10.5)

## 2019-05-13 PROCEDURE — 88305 TISSUE EXAM BY PATHOLOGIST: CPT | Mod: 26

## 2019-05-13 PROCEDURE — 27447 TOTAL KNEE ARTHROPLASTY: CPT | Mod: 58,LT

## 2019-05-13 PROCEDURE — 88311 DECALCIFY TISSUE: CPT | Mod: 26

## 2019-05-13 PROCEDURE — 99232 SBSQ HOSP IP/OBS MODERATE 35: CPT

## 2019-05-13 PROCEDURE — 73562 X-RAY EXAM OF KNEE 3: CPT | Mod: 26,LT

## 2019-05-13 RX ORDER — DOCUSATE SODIUM 100 MG
100 CAPSULE ORAL THREE TIMES A DAY
Refills: 0 | Status: DISCONTINUED | OUTPATIENT
Start: 2019-05-13 | End: 2019-05-16

## 2019-05-13 RX ORDER — ACETAMINOPHEN 500 MG
1000 TABLET ORAL EVERY 8 HOURS
Refills: 0 | Status: DISCONTINUED | OUTPATIENT
Start: 2019-05-14 | End: 2019-05-16

## 2019-05-13 RX ORDER — ONDANSETRON 8 MG/1
4 TABLET, FILM COATED ORAL EVERY 6 HOURS
Refills: 0 | Status: DISCONTINUED | OUTPATIENT
Start: 2019-05-13 | End: 2019-05-16

## 2019-05-13 RX ORDER — CELECOXIB 200 MG/1
200 CAPSULE ORAL EVERY 12 HOURS
Refills: 0 | Status: DISCONTINUED | OUTPATIENT
Start: 2019-05-13 | End: 2019-05-16

## 2019-05-13 RX ORDER — AMLODIPINE BESYLATE 2.5 MG/1
10 TABLET ORAL DAILY
Refills: 0 | Status: DISCONTINUED | OUTPATIENT
Start: 2019-05-13 | End: 2019-05-16

## 2019-05-13 RX ORDER — ONDANSETRON 8 MG/1
4 TABLET, FILM COATED ORAL ONCE
Refills: 0 | Status: DISCONTINUED | OUTPATIENT
Start: 2019-05-13 | End: 2019-05-13

## 2019-05-13 RX ORDER — SENNA PLUS 8.6 MG/1
2 TABLET ORAL AT BEDTIME
Refills: 0 | Status: DISCONTINUED | OUTPATIENT
Start: 2019-05-13 | End: 2019-05-16

## 2019-05-13 RX ORDER — OXYCODONE HYDROCHLORIDE 5 MG/1
5 TABLET ORAL
Refills: 0 | Status: DISCONTINUED | OUTPATIENT
Start: 2019-05-13 | End: 2019-05-16

## 2019-05-13 RX ORDER — OXYCODONE HYDROCHLORIDE 5 MG/1
10 TABLET ORAL
Refills: 0 | Status: DISCONTINUED | OUTPATIENT
Start: 2019-05-13 | End: 2019-05-16

## 2019-05-13 RX ORDER — TRANEXAMIC ACID 100 MG/ML
1000 INJECTION, SOLUTION INTRAVENOUS ONCE
Refills: 0 | Status: COMPLETED | OUTPATIENT
Start: 2019-05-13 | End: 2019-05-13

## 2019-05-13 RX ORDER — APIXABAN 2.5 MG/1
2.5 TABLET, FILM COATED ORAL EVERY 12 HOURS
Refills: 0 | Status: DISCONTINUED | OUTPATIENT
Start: 2019-05-14 | End: 2019-05-16

## 2019-05-13 RX ORDER — MONTELUKAST 4 MG/1
10 TABLET, CHEWABLE ORAL DAILY
Refills: 0 | Status: DISCONTINUED | OUTPATIENT
Start: 2019-05-13 | End: 2019-05-16

## 2019-05-13 RX ORDER — HYDROMORPHONE HYDROCHLORIDE 2 MG/ML
0.5 INJECTION INTRAMUSCULAR; INTRAVENOUS; SUBCUTANEOUS
Refills: 0 | Status: DISCONTINUED | OUTPATIENT
Start: 2019-05-13 | End: 2019-05-16

## 2019-05-13 RX ORDER — BUDESONIDE AND FORMOTEROL FUMARATE DIHYDRATE 160; 4.5 UG/1; UG/1
2 AEROSOL RESPIRATORY (INHALATION)
Refills: 0 | Status: DISCONTINUED | OUTPATIENT
Start: 2019-05-13 | End: 2019-05-16

## 2019-05-13 RX ORDER — TAMSULOSIN HYDROCHLORIDE 0.4 MG/1
0.4 CAPSULE ORAL AT BEDTIME
Refills: 0 | Status: DISCONTINUED | OUTPATIENT
Start: 2019-05-13 | End: 2019-05-16

## 2019-05-13 RX ORDER — SODIUM CHLORIDE 9 MG/ML
1000 INJECTION, SOLUTION INTRAVENOUS
Refills: 0 | Status: DISCONTINUED | OUTPATIENT
Start: 2019-05-13 | End: 2019-05-14

## 2019-05-13 RX ORDER — ALBUTEROL 90 UG/1
2 AEROSOL, METERED ORAL EVERY 6 HOURS
Refills: 0 | Status: DISCONTINUED | OUTPATIENT
Start: 2019-05-13 | End: 2019-05-16

## 2019-05-13 RX ORDER — PANTOPRAZOLE SODIUM 20 MG/1
40 TABLET, DELAYED RELEASE ORAL
Refills: 0 | Status: DISCONTINUED | OUTPATIENT
Start: 2019-05-13 | End: 2019-05-16

## 2019-05-13 RX ORDER — LOSARTAN POTASSIUM 100 MG/1
100 TABLET, FILM COATED ORAL DAILY
Refills: 0 | Status: DISCONTINUED | OUTPATIENT
Start: 2019-05-15 | End: 2019-05-16

## 2019-05-13 RX ORDER — APREPITANT 80 MG/1
40 CAPSULE ORAL ONCE
Refills: 0 | Status: COMPLETED | OUTPATIENT
Start: 2019-05-13 | End: 2019-05-13

## 2019-05-13 RX ORDER — MAGNESIUM HYDROXIDE 400 MG/1
30 TABLET, CHEWABLE ORAL DAILY
Refills: 0 | Status: DISCONTINUED | OUTPATIENT
Start: 2019-05-13 | End: 2019-05-16

## 2019-05-13 RX ORDER — FLUTICASONE PROPIONATE 50 MCG
1 SPRAY, SUSPENSION NASAL
Refills: 0 | Status: DISCONTINUED | OUTPATIENT
Start: 2019-05-13 | End: 2019-05-16

## 2019-05-13 RX ORDER — POLYETHYLENE GLYCOL 3350 17 G/17G
17 POWDER, FOR SOLUTION ORAL DAILY
Refills: 0 | Status: DISCONTINUED | OUTPATIENT
Start: 2019-05-13 | End: 2019-05-16

## 2019-05-13 RX ORDER — SODIUM CHLORIDE 9 MG/ML
1000 INJECTION, SOLUTION INTRAVENOUS
Refills: 0 | Status: DISCONTINUED | OUTPATIENT
Start: 2019-05-13 | End: 2019-05-13

## 2019-05-13 RX ORDER — VANCOMYCIN HCL 1 G
1500 VIAL (EA) INTRAVENOUS ONCE
Refills: 0 | Status: COMPLETED | OUTPATIENT
Start: 2019-05-13 | End: 2019-05-13

## 2019-05-13 RX ORDER — ASPIRIN/CALCIUM CARB/MAGNESIUM 324 MG
81 TABLET ORAL DAILY
Refills: 0 | Status: DISCONTINUED | OUTPATIENT
Start: 2019-05-14 | End: 2019-05-16

## 2019-05-13 RX ORDER — HYDROMORPHONE HYDROCHLORIDE 2 MG/ML
0.5 INJECTION INTRAMUSCULAR; INTRAVENOUS; SUBCUTANEOUS
Refills: 0 | Status: DISCONTINUED | OUTPATIENT
Start: 2019-05-13 | End: 2019-05-13

## 2019-05-13 RX ORDER — ACETAMINOPHEN 500 MG
1000 TABLET ORAL EVERY 6 HOURS
Refills: 0 | Status: COMPLETED | OUTPATIENT
Start: 2019-05-13 | End: 2019-05-14

## 2019-05-13 RX ADMIN — OXYCODONE HYDROCHLORIDE 10 MILLIGRAM(S): 5 TABLET ORAL at 17:07

## 2019-05-13 RX ADMIN — OXYCODONE HYDROCHLORIDE 10 MILLIGRAM(S): 5 TABLET ORAL at 21:37

## 2019-05-13 RX ADMIN — Medication 400 MILLIGRAM(S): at 13:41

## 2019-05-13 RX ADMIN — OXYCODONE HYDROCHLORIDE 10 MILLIGRAM(S): 5 TABLET ORAL at 21:07

## 2019-05-13 RX ADMIN — TAMSULOSIN HYDROCHLORIDE 0.4 MILLIGRAM(S): 0.4 CAPSULE ORAL at 21:07

## 2019-05-13 RX ADMIN — Medication 1000 MILLIGRAM(S): at 13:42

## 2019-05-13 RX ADMIN — LOSARTAN POTASSIUM 100 MILLIGRAM(S): 100 TABLET, FILM COATED ORAL at 05:33

## 2019-05-13 RX ADMIN — HYDROMORPHONE HYDROCHLORIDE 0.5 MILLIGRAM(S): 2 INJECTION INTRAMUSCULAR; INTRAVENOUS; SUBCUTANEOUS at 12:19

## 2019-05-13 RX ADMIN — HYDROMORPHONE HYDROCHLORIDE 0.5 MILLIGRAM(S): 2 INJECTION INTRAMUSCULAR; INTRAVENOUS; SUBCUTANEOUS at 12:51

## 2019-05-13 RX ADMIN — AMLODIPINE BESYLATE 10 MILLIGRAM(S): 2.5 TABLET ORAL at 05:33

## 2019-05-13 RX ADMIN — APREPITANT 40 MILLIGRAM(S): 80 CAPSULE ORAL at 07:42

## 2019-05-13 RX ADMIN — SENNA PLUS 2 TABLET(S): 8.6 TABLET ORAL at 21:07

## 2019-05-13 RX ADMIN — Medication 400 MILLIGRAM(S): at 21:07

## 2019-05-13 RX ADMIN — OXYCODONE HYDROCHLORIDE 10 MILLIGRAM(S): 5 TABLET ORAL at 13:43

## 2019-05-13 RX ADMIN — Medication 100 MILLIGRAM(S): at 21:07

## 2019-05-13 RX ADMIN — OXYCODONE HYDROCHLORIDE 10 MILLIGRAM(S): 5 TABLET ORAL at 00:00

## 2019-05-13 RX ADMIN — MONTELUKAST 10 MILLIGRAM(S): 4 TABLET, CHEWABLE ORAL at 21:07

## 2019-05-13 RX ADMIN — CELECOXIB 200 MILLIGRAM(S): 200 CAPSULE ORAL at 21:07

## 2019-05-13 RX ADMIN — Medication 1000 MILLIGRAM(S): at 21:30

## 2019-05-13 RX ADMIN — HYDROMORPHONE HYDROCHLORIDE 0.5 MILLIGRAM(S): 2 INJECTION INTRAMUSCULAR; INTRAVENOUS; SUBCUTANEOUS at 12:08

## 2019-05-13 RX ADMIN — Medication 300 MILLIGRAM(S): at 17:02

## 2019-05-13 RX ADMIN — PANTOPRAZOLE SODIUM 40 MILLIGRAM(S): 20 TABLET, DELAYED RELEASE ORAL at 05:33

## 2019-05-13 RX ADMIN — OXYCODONE HYDROCHLORIDE 10 MILLIGRAM(S): 5 TABLET ORAL at 23:59

## 2019-05-13 RX ADMIN — OXYCODONE HYDROCHLORIDE 10 MILLIGRAM(S): 5 TABLET ORAL at 13:48

## 2019-05-13 RX ADMIN — Medication 300 MILLIGRAM(S): at 06:11

## 2019-05-13 RX ADMIN — CELECOXIB 200 MILLIGRAM(S): 200 CAPSULE ORAL at 21:30

## 2019-05-13 RX ADMIN — Medication 1 SPRAY(S): at 05:34

## 2019-05-13 NOTE — PROGRESS NOTE ADULT - SUBJECTIVE AND OBJECTIVE BOX
Patient is a 73y old  Male who presents with a chief complaint of S/P right Total knee replacement staged (12 May 2019 11:11)      Subjective:    MEDICATIONS  (STANDING):  acetaminophen  IVPB .. 1000 milliGRAM(s) IV Intermittent every 6 hours  amLODIPine   Tablet 10 milliGRAM(s) Oral daily  buDESOnide 160 MICROgram(s)/formoterol 4.5 MICROgram(s) Inhaler 2 Puff(s) Inhalation two times a day  celecoxib 200 milliGRAM(s) Oral every 12 hours  docusate sodium 100 milliGRAM(s) Oral three times a day  lactated ringers. 1000 milliLiter(s) (100 mL/Hr) IV Continuous <Continuous>  montelukast 10 milliGRAM(s) Oral daily  pantoprazole    Tablet 40 milliGRAM(s) Oral before breakfast  senna 2 Tablet(s) Oral at bedtime  tamsulosin 0.4 milliGRAM(s) Oral at bedtime  vancomycin  IVPB 1500 milliGRAM(s) IV Intermittent once    MEDICATIONS  (PRN):  ALBUTerol    90 MICROgram(s) HFA Inhaler 2 Puff(s) Inhalation every 6 hours PRN for allergy symptoms  aluminum hydroxide/magnesium hydroxide/simethicone Suspension 30 milliLiter(s) Oral four times a day PRN Indigestion  fluticasone propionate 50 MICROgram(s)/spray Nasal Spray 1 Spray(s) Both Nostrils two times a day PRN allergies  HYDROmorphone  Injectable 0.5 milliGRAM(s) IV Push every 3 hours PRN Severe Pain (7 - 10)  magnesium hydroxide Suspension 30 milliLiter(s) Oral daily PRN Constipation  ondansetron Injectable 4 milliGRAM(s) IV Push every 6 hours PRN Nausea and/or Vomiting  oxyCODONE    IR 5 milliGRAM(s) Oral every 3 hours PRN Mild Pain (1 - 3)  oxyCODONE    IR 10 milliGRAM(s) Oral every 3 hours PRN Moderate Pain (4 - 6)  polyethylene glycol 3350 17 Gram(s) Oral daily PRN Constipation      Allergies    No Known Allergies    Intolerances        REVIEW OF SYSTEMS:  negative unless otherwise specified above.    Vital Signs Last 24 Hrs  T(C): 36.6 (13 May 2019 13:00), Max: 37 (13 May 2019 04:13)  T(F): 97.8 (13 May 2019 13:00), Max: 98.6 (13 May 2019 04:13)  HR: 78 (13 May 2019 13:00) (57 - 78)  BP: 127/65 (13 May 2019 13:00) (97/53 - 133/49)  BP(mean): --  RR: 16 (13 May 2019 13:00) (12 - 20)  SpO2: 97% (13 May 2019 13:00) (93% - 97%)    PHYSICAL EXAM:  GENERAL: No apparent distress  HEAD:  Atraumatic, Normocephalic  EYES: conjunctiva and sclera clear  ENMT: Moist mucous membranes  NECK: Supple  CHEST/LUNG: Clear to auscultation bilaterally  HEART: Regular rate and rhythm  ABDOMEN: Soft, Nontender, Nondistended; Bowel sounds present  EXTREMITIES:  No clubbing, cyanosis or edema  SKIN: No rashes or lesions  NERVOUS SYSTEM:  Alert & Oriented X3; Bilateral Lower extremity mobile, sensation to light touch intact  INCISION:  Dressing dry and intact        LABS:   WBC Count: 7.17 K/uL (05-13 @ 07:42)  Platelet Count - Automated: 214 K/uL (05-13 @ 07:42)  Hematocrit: 30.1 % <L> (05-13 @ 07:42)  RBC Count: 3.11 M/uL <L> (05-13 @ 07:42)  Hemoglobin: 9.6 g/dL <L> (05-13 @ 07:42)      05-13    140  |  105  |  15  ----------------------------<  148<H>  3.8   |  31  |  0.98    Ca    8.9      13 May 2019 07:42                  PT/INR - ( 12 May 2019 06:49 )   PT: 12.2 sec;   INR: 1.12 ratio         PTT - ( 12 May 2019 06:49 )  PTT:33.2 sec                      IMAGING: images reviewed personally      Consultant Notes Reviewed and Care Discussed with relevant Consultants/Other Providers. Patient is a 73y old  Male who presents with a chief complaint of S/P right Total knee replacement staged (12 May 2019 11:11)      Subjective: stage 2 done today.  feels well    MEDICATIONS  (STANDING):  acetaminophen  IVPB .. 1000 milliGRAM(s) IV Intermittent every 6 hours  amLODIPine   Tablet 10 milliGRAM(s) Oral daily  buDESOnide 160 MICROgram(s)/formoterol 4.5 MICROgram(s) Inhaler 2 Puff(s) Inhalation two times a day  celecoxib 200 milliGRAM(s) Oral every 12 hours  docusate sodium 100 milliGRAM(s) Oral three times a day  lactated ringers. 1000 milliLiter(s) (100 mL/Hr) IV Continuous <Continuous>  montelukast 10 milliGRAM(s) Oral daily  pantoprazole    Tablet 40 milliGRAM(s) Oral before breakfast  senna 2 Tablet(s) Oral at bedtime  tamsulosin 0.4 milliGRAM(s) Oral at bedtime  vancomycin  IVPB 1500 milliGRAM(s) IV Intermittent once    MEDICATIONS  (PRN):  ALBUTerol    90 MICROgram(s) HFA Inhaler 2 Puff(s) Inhalation every 6 hours PRN for allergy symptoms  aluminum hydroxide/magnesium hydroxide/simethicone Suspension 30 milliLiter(s) Oral four times a day PRN Indigestion  fluticasone propionate 50 MICROgram(s)/spray Nasal Spray 1 Spray(s) Both Nostrils two times a day PRN allergies  HYDROmorphone  Injectable 0.5 milliGRAM(s) IV Push every 3 hours PRN Severe Pain (7 - 10)  magnesium hydroxide Suspension 30 milliLiter(s) Oral daily PRN Constipation  ondansetron Injectable 4 milliGRAM(s) IV Push every 6 hours PRN Nausea and/or Vomiting  oxyCODONE    IR 5 milliGRAM(s) Oral every 3 hours PRN Mild Pain (1 - 3)  oxyCODONE    IR 10 milliGRAM(s) Oral every 3 hours PRN Moderate Pain (4 - 6)  polyethylene glycol 3350 17 Gram(s) Oral daily PRN Constipation      Allergies    No Known Allergies    Intolerances        REVIEW OF SYSTEMS:  negative unless otherwise specified above.    Vital Signs Last 24 Hrs  T(C): 36.6 (13 May 2019 13:00), Max: 37 (13 May 2019 04:13)  T(F): 97.8 (13 May 2019 13:00), Max: 98.6 (13 May 2019 04:13)  HR: 78 (13 May 2019 13:00) (57 - 78)  BP: 127/65 (13 May 2019 13:00) (97/53 - 133/49)  BP(mean): --  RR: 16 (13 May 2019 13:00) (12 - 20)  SpO2: 97% (13 May 2019 13:00) (93% - 97%)    PHYSICAL EXAM:  GENERAL: No apparent distress  HEAD:  Atraumatic, Normocephalic  EYES: conjunctiva and sclera clear  ENMT: Moist mucous membranes  NECK: Supple  CHEST/LUNG: Clear to auscultation bilaterally  HEART: Regular rate and rhythm  ABDOMEN: Soft, Nontender, Nondistended; Bowel sounds present  EXTREMITIES:  No clubbing, cyanosis or edema  SKIN: No rashes or lesions  NERVOUS SYSTEM:  Alert & Oriented X3; Bilateral Lower extremity mobile, sensation to light touch intact  INCISION:  Dressing dry and intact        LABS:   WBC Count: 7.17 K/uL (05-13 @ 07:42)  Platelet Count - Automated: 214 K/uL (05-13 @ 07:42)  Hematocrit: 30.1 % <L> (05-13 @ 07:42)  RBC Count: 3.11 M/uL <L> (05-13 @ 07:42)  Hemoglobin: 9.6 g/dL <L> (05-13 @ 07:42)      05-13    140  |  105  |  15  ----------------------------<  148<H>  3.8   |  31  |  0.98    Ca    8.9      13 May 2019 07:42                  PT/INR - ( 12 May 2019 06:49 )   PT: 12.2 sec;   INR: 1.12 ratio         PTT - ( 12 May 2019 06:49 )  PTT:33.2 sec                      IMAGING: images reviewed personally      Consultant Notes Reviewed and Care Discussed with relevant Consultants/Other Providers.

## 2019-05-13 NOTE — DISCHARGE NOTE PROVIDER - NSDCFUADDINST_GEN_ALL_CORE_FT

## 2019-05-13 NOTE — PHYSICAL THERAPY INITIAL EVALUATION ADULT - ADDITIONAL COMMENTS
Pt lives alone in an elevator apt with 3 steps to enter with no rail.  Pt has no DME. Pt has no DME. Pt lives alone in an elevator apt with 3 steps to enter with no rail, + tub with curtain.  Pt has no DME.  Pt states he will stay at daughters house upon d/c from rehab (states she is  and will not be home often), Daughter lives in condo, 0 KATE, + stall shower. Pt states his mother will stay with him upon d/c (92 yrs old)

## 2019-05-13 NOTE — OCCUPATIONAL THERAPY INITIAL EVALUATION ADULT - IADL RETRAINING, OT EVAL
Patient will increase standing tolerance to 3-5 minutes for grooming at the sink within 2-4 sessions
Patient will increase standing tolerance to 3-5 minutes for grooming at the sink within 2-4 sessions

## 2019-05-13 NOTE — DISCHARGE NOTE PROVIDER - NSDCACTIVITY_GEN_ALL_CORE
Walking - Outdoors allowed/Do not drive or operate machinery/No heavy lifting/straining/Stairs allowed/Walking - Indoors allowed Walking - Outdoors allowed/Walking - Indoors allowed/No heavy lifting/straining/Showering allowed/Stairs allowed/Do not drive or operate machinery

## 2019-05-13 NOTE — OCCUPATIONAL THERAPY INITIAL EVALUATION ADULT - ANTICIPATED DISCHARGE DISPOSITION, OT EVAL
MUNA, states he will stay at daughter initially but will be home alone OT recommending home however pt requesting rehab. Pt states he will stay at daughter initially , no KATE, with elderly mother & wants rehab

## 2019-05-13 NOTE — OCCUPATIONAL THERAPY INITIAL EVALUATION ADULT - TRANSFER TRAINING, PT EVAL
Patient will transfer to toilet with DME as needed with CG within 2-4 sessions Patient will transfer to toilet with DME as needed Independently  within 2-4 sessions

## 2019-05-13 NOTE — PHYSICAL THERAPY INITIAL EVALUATION ADULT - GAIT TRAINING, PT EVAL
Goals 3-5 days, Pt will ambulate 150 ft w/ rolling walker independently
Goals 3-5 days, Pt will ambulate 150 ft w/ rolling walker independently

## 2019-05-13 NOTE — DISCHARGE NOTE PROVIDER - CARE PROVIDER_API CALL
Vazquez De Dios)  Orthopaedic Surgery  [address], [address]  [place] [address]  Phone: [phone]  Fax: [phone]  Follow Up Time: 2 weeks

## 2019-05-13 NOTE — PHYSICAL THERAPY INITIAL EVALUATION ADULT - RANGE OF MOTION EXAMINATION, REHAB EVAL
left knee not tested due to surgery/bilateral upper extremity ROM was WNL (within normal limits)/deficits as listed below
deficits as listed below/right knee flexion 50 deg

## 2019-05-13 NOTE — PHYSICAL THERAPY INITIAL EVALUATION ADULT - GENERAL OBSERVATIONS, REHAB EVAL
pt received semisupine in bed, +IV, +PAS, 2Lo2, +bandaging left knee
pt received semisupine in bed, +IV, +PAS

## 2019-05-13 NOTE — DISCHARGE NOTE PROVIDER - NSDCCPGOAL_GEN_ALL_CORE_FT
To get better and follow your care plan as instructed. Improve ambulation, ADLs and quality of life.

## 2019-05-13 NOTE — OCCUPATIONAL THERAPY INITIAL EVALUATION ADULT - ADL RETRAINING, OT EVAL
Patient will dress lower body with minimal assistance, AE as needed within 2-4 sessions Patient will dress lower body Independently , AE as needed within 2-4 sessions

## 2019-05-13 NOTE — PHYSICAL THERAPY INITIAL EVALUATION ADULT - TRANSFER TRAINING, PT EVAL
Goals 3-5 days, Pt will perform sit to stand w/ rolling walker independently
Goals 3-5 days, Pt will perform sit to stand w/ rolling walker independently

## 2019-05-13 NOTE — PHYSICAL THERAPY INITIAL EVALUATION ADULT - PLANNED THERAPY INTERVENTIONS, PT EVAL
bed mobility training/transfer training/gait training
gait training/bed mobility training/transfer training

## 2019-05-13 NOTE — PHYSICAL THERAPY INITIAL EVALUATION ADULT - BED MOBILITY TRAINING, PT EVAL
Goals 3-5 days, Pt will perform bed mobility independently
Goals 3-5 days, Pt will perform bed mobility independently

## 2019-05-13 NOTE — BRIEF OPERATIVE NOTE - NSICDXBRIEFPROCEDURE_GEN_ALL_CORE_FT
PROCEDURES:  Left total knee replacement 13-May-2019 11:02:38  Stacia Connolly
PROCEDURES:  Right total knee replacement 08-May-2019 07:04:13  Vasiliy Reyes

## 2019-05-13 NOTE — PRE-OP CHECKLIST - LATEX ALLERGY
Patient arrived for every 8 week Remicade infusion for CC (Crohn's colitis) (CMS/Formerly [facility]) per Dr. Pierce. Patient verbalizes no new concerns at present time. Proceed with Remicade per order.    Regimen: Remicade    Dr. Joseph Lui is supervising provider today.    Nursing Assessment: A focused nursing assessment  was performed and the patient reports the following:   Nausea: NO  Vomiting: NO  Fever: NO  Chills: NO  Other signs of infection: NO  Bleeding: NO  Mucositis: NO  Diarrhea: NO  Constipation: NO  Anorexia: NO  Dysuria: NO  Urinary Bleeding: NO  Cough: NO  Shortness of Breath: NO  Fatigue/Weakness: NO  Numbness/Tingling: NO  Other Neuropathies: NO  Edema: NO  Rash: NO  Hand/Foot Syndrome: NO  Anxiety/Depression/Insomnia: NO  Pain: NO    Pre-Treatment: - Patient has valid pre-authorization, - VS completed, - Height and weight verified and - Treatment parameters verified in patient protocol    Treatment: Refer to [facility] and MAR for line assessment and medication administration, Blood return confirmed before, during and after treatment administered and Infusion pump used for non-vesicant drugs    Post Treatment: Treatment tolerated well; no adverse reaction    Education: No new instructions needed    Next appointment scheduled:   Future Appointments   Date Time Provider Department Center   4/19/2019  9:00 AM PWA IM NURSE [address] [facility]   7/18/2019  9:20 AM Claudio Pierce MD [facility]     Patient instructed to call the office with any questions or concerns.    Patient Discharged: Pt discharged to home per self, ambulatory.    Patient tolerated Remicade infusion well with no adverse side effects. Patient instructed to notify clinic with any adverse side effects, questions or concerns. Patient verbalized an understanding. Patient verbalized an understanding. Patient discharged home ambulatory.       
no
no

## 2019-05-13 NOTE — DISCHARGE NOTE PROVIDER - NSDCCPCAREPLAN_GEN_ALL_CORE_FT
PRINCIPAL DISCHARGE DIAGNOSIS  Diagnosis: Osteoarthritis of knees, bilateral  Assessment and Plan of Treatment: For your total knee replacement:  Physical Therapy/Occupational Therapy for ambulation, transfers, stairs, ADL's, Range of Motion Exercises, Isometrics.  Full weight bearing as tolerated with rolling walker  Range of Motion Goals: Flexion 120 degrees; Extension 0 degrees  Keep incision clean and dry.  Prineo dressing removal 14 days after surgery at rehab facility or Surgeon's office  May shower post-op day #5 if no drainage from incision

## 2019-05-13 NOTE — PHYSICAL THERAPY INITIAL EVALUATION ADULT - MANUAL MUSCLE TESTING RESULTS, REHAB EVAL
no strength deficits were identified/right knee extn 3+/5, left knee extn 3-/5
grossly assessed due to/BLEs grossly 3/5

## 2019-05-13 NOTE — PRE-OP CHECKLIST - SELECT TESTS ORDERED
Results in MD note/CMP/CBC/PT/PTT/INR/EKG/CXR/BMP/Type and Screen
CBC/EKG/INR/CMP/PT/PTT/Type and Screen

## 2019-05-13 NOTE — BRIEF OPERATIVE NOTE - NSICDXBRIEFPOSTOP_GEN_ALL_CORE_FT
POST-OP DIAGNOSIS:  Primary osteoarthritis of left knee 13-May-2019 11:03:09  Stacia Connolly
POST-OP DIAGNOSIS:  Osteoarthritis of right knee 08-May-2019 07:04:48  Vasiliy Reyes

## 2019-05-13 NOTE — DISCHARGE NOTE PROVIDER - NSDCCPTREATMENT_GEN_ALL_CORE_FT
PRINCIPAL PROCEDURE  Procedure: Right total knee replacement  Findings and Treatment: Severe DJD right knee      SECONDARY PROCEDURE  Procedure: Total left knee replacement  Findings and Treatment: Severe DJD left knee

## 2019-05-13 NOTE — OCCUPATIONAL THERAPY INITIAL EVALUATION ADULT - ADDITIONAL COMMENTS
Pt lives alone in an elevator apt with 3 steps to enter with no rail, + tub with curtain.  Pt has no DME.  Pt states he will stay at daughters house upon d/c from rehab (states she is  and will not be home often), Daughter lives in condo, 0 [address], + stall shower. Pt lives alone in an elevator apt with 3 steps to enter with no rail, + tub with curtain.  Pt has no DME.  Pt states he will stay at daughters house upon d/c from rehab (states she is  and will not be home often), Daughter lives in condo, [address], + stall shower. Pt states his mother will stay with him upon d/c (92 yrs old)

## 2019-05-13 NOTE — PROGRESS NOTE ADULT - SUBJECTIVE AND OBJECTIVE BOX
Ortho Post Op Check    Procedure: left TKA  Surgeon:Va    Pt comfortable without complaints, pain controlled  Denies CP, SOB, N/V, numbness/tingling   Pain Rx:  acetaminophen  IVPB .. 1000 milliGRAM(s) IV Intermittent every 6 hours  celecoxib 200 milliGRAM(s) Oral every 12 hours  HYDROmorphone  Injectable 0.5 milliGRAM(s) IV Push every 3 hours PRN  ondansetron Injectable 4 milliGRAM(s) IV Push every 6 hours PRN  oxyCODONE    IR 5 milliGRAM(s) Oral every 3 hours PRN  oxyCODONE    IR 10 milliGRAM(s) Oral every 3 hours PRN      General Exam:  Vital Signs Last 24 Hrs  T(C): 36.6 (05-13-19 @ 13:00), Max: 36.6 (05-13-19 @ 11:10)  T(F): 97.8 (05-13-19 @ 13:00), Max: 97.9 (05-13-19 @ 11:10)  HR: 78 (05-13-19 @ 13:00) (61 - 78)  BP: 127/65 (05-13-19 @ 13:00) (120/62 - 129/68)  BP(mean): --  RR: 16 (05-13-19 @ 13:00) (12 - 20)  SpO2: 97% (05-13-19 @ 13:00) (96% - 97%)    General: Pt Alert and oriented, NAD, controlled pain.  Heart: rrr no murmur  Lungs:clear  Abdomen: BS+ soft.  Ext(Knee): Right  knee w/prineo dry and intact.  Left knee w/ dressing dry and intact    ROM: right knee 0-92 as measured by PT yesterday  Walked 25' today   Neuro/Vasc: Feet toes warm, pink. DP = 2+. No calf tenderness bilat.. Has sensation over feet & toes bilat. Full AROM bilat feet & toes. EHL= 5/5  Voiding well  VTEP: On Venodynes Bilat + BID  Eliquis      A/P: 73yMale POD#0 s/p left tka, POD 5 right TKA  - Stable  - Pain Control  - DVT ppx: eliquis  - Post op abx: vanco  - PT eval -- walked 25'  - Weight bearing status: wbat  -d/c plan -- pt wants rehab.

## 2019-05-13 NOTE — DISCHARGE NOTE PROVIDER - INSTRUCTIONS
no change  For Constipation :   • Increase your water intake. Drink at least 8 glasses of water daily.  • Try adding fiber to your diet by eating fruits, vegetables and foods that are rich in grains.  • If you do experience constipation, you may take an over-the-counter stool softener/laxative such as Megan Colace, [place] or  Milk of Magnesia.

## 2019-05-13 NOTE — PHYSICAL THERAPY INITIAL EVALUATION ADULT - GAIT DEVIATIONS NOTED, PT EVAL
decreased azul/decreased stride length/decreased weight-shifting ability/decreased velocity of limb motion/decreased step length
decreased azul/decreased stride length/decreased step length/decreased weight-shifting ability

## 2019-05-13 NOTE — PHYSICAL THERAPY INITIAL EVALUATION ADULT - PERTINENT HX OF CURRENT PROBLEM, REHAB EVAL
bilat knee pain ,s/p right total knee replacement stage 1 5/8
s/p right total knee replacement stage 1

## 2019-05-13 NOTE — BRIEF OPERATIVE NOTE - NSICDXBRIEFPREOP_GEN_ALL_CORE_FT
PRE-OP DIAGNOSIS:  Primary osteoarthritis of left knee 13-May-2019 11:02:56  Stacia Connolly
PRE-OP DIAGNOSIS:  Osteoarthritis of right knee 08-May-2019 07:04:33  Vasiliy Reyes

## 2019-05-13 NOTE — DISCHARGE NOTE PROVIDER - HOSPITAL COURSE
The patient is a 73 y.o. male with severe osteoarthritis of bilateral knees. Patient was seen by Dr. Vazquez De Dios and scheduled for bilateral staged TKR.    After admission on 5/8/19 and receiving pre-operative parenteral prophylactic antibiotics, the patient  underwent an  uncomplicated Right TKR by Dr. eD Dios.    A medical consultation from the Hospitalist service was obtained for post-operative medical co-management. Typical Physical & occupational therapy modalities were performed including ambulation training, range of motion, ADL's, and transfers. Lovenox was given for DVT prophylaxis.    The patient progressed well with physical and occupational therapy. Patient was re-evaluated by the medical hospitalist and deemed stable from a medical and orthopedic standpoint to undergo the second staged procedure.     After receiving the appropriate parenteral antibiotic, the patient was taken back to the operating room on 5/13/19 for an elective Left TKR, which was performed by Dr. De Dios.    A medical consultation from the Hospitalist service was obtained for post-operative medical co-management. Typical Physical & occupational therapy modalities were performed including ambulation training, range of motion, ADL's, and transfers. Eliquis was given for DVT prophylaxis.      The patient had a clean appearing surgical incision with no sign of surgical site infections and had a stable neurovascular exam of the operated extremity.  After progression of mobility guided by the PT/OT staff,  the patient was felt to benefit from further rehabilitative care for restoration to level of function.  Discharge and Orthopedic Care instructions were delineated in the Discharge Plan and reviewed with the patient. All medications were delineated in the medication reconciliation tool and key points were reviewed with the patient. Patient was deemed stable from an Orthopedic & medical standpoint for discharge today. Patient will be  following up with Dr. De Dios 2 weeks after surgery.

## 2019-05-13 NOTE — PHYSICAL THERAPY INITIAL EVALUATION ADULT - IMPAIRMENTS CONTRIBUTING TO GAIT DEVIATIONS, PT EVAL
impaired balance/decreased strength/decreased ROM
decreased ROM/impaired balance/decreased strength/pain

## 2019-05-14 DIAGNOSIS — E78.5 HYPERLIPIDEMIA, UNSPECIFIED: ICD-10-CM

## 2019-05-14 LAB
ANION GAP SERPL CALC-SCNC: 7 MMOL/L — SIGNIFICANT CHANGE UP (ref 5–17)
BUN SERPL-MCNC: 12 MG/DL — SIGNIFICANT CHANGE UP (ref 7–23)
CALCIUM SERPL-MCNC: 9.1 MG/DL — SIGNIFICANT CHANGE UP (ref 8.4–10.5)
CHLORIDE SERPL-SCNC: 106 MMOL/L — SIGNIFICANT CHANGE UP (ref 96–108)
CO2 SERPL-SCNC: 27 MMOL/L — SIGNIFICANT CHANGE UP (ref 22–31)
CREAT SERPL-MCNC: 0.89 MG/DL — SIGNIFICANT CHANGE UP (ref 0.5–1.3)
GLUCOSE SERPL-MCNC: 146 MG/DL — HIGH (ref 70–99)
HCT VFR BLD CALC: 28.1 % — LOW (ref 39–50)
HGB BLD-MCNC: 9 G/DL — LOW (ref 13–17)
MCHC RBC-ENTMCNC: 30.7 PG — SIGNIFICANT CHANGE UP (ref 27–34)
MCHC RBC-ENTMCNC: 32 GM/DL — SIGNIFICANT CHANGE UP (ref 32–36)
MCV RBC AUTO: 95.9 FL — SIGNIFICANT CHANGE UP (ref 80–100)
NRBC # BLD: 0 /100 WBCS — SIGNIFICANT CHANGE UP (ref 0–0)
PLATELET # BLD AUTO: 233 K/UL — SIGNIFICANT CHANGE UP (ref 150–400)
POTASSIUM SERPL-MCNC: 4.3 MMOL/L — SIGNIFICANT CHANGE UP (ref 3.5–5.3)
POTASSIUM SERPL-SCNC: 4.3 MMOL/L — SIGNIFICANT CHANGE UP (ref 3.5–5.3)
RBC # BLD: 2.93 M/UL — LOW (ref 4.2–5.8)
RBC # FLD: 13.1 % — SIGNIFICANT CHANGE UP (ref 10.3–14.5)
SODIUM SERPL-SCNC: 140 MMOL/L — SIGNIFICANT CHANGE UP (ref 135–145)
WBC # BLD: 11.39 K/UL — HIGH (ref 3.8–10.5)
WBC # FLD AUTO: 11.39 K/UL — HIGH (ref 3.8–10.5)

## 2019-05-14 PROCEDURE — 99232 SBSQ HOSP IP/OBS MODERATE 35: CPT

## 2019-05-14 RX ORDER — FLUTICASONE FUROATE AND VILANTEROL TRIFENATATE 100; 25 UG/1; UG/1
1 POWDER RESPIRATORY (INHALATION)
Qty: 0 | Refills: 0 | DISCHARGE

## 2019-05-14 RX ORDER — MAGNESIUM HYDROXIDE 400 MG/1
30 TABLET, CHEWABLE ORAL
Qty: 0 | Refills: 0 | DISCHARGE
Start: 2019-05-14

## 2019-05-14 RX ORDER — CHLORTHALIDONE 50 MG
1 TABLET ORAL
Qty: 0 | Refills: 0 | DISCHARGE

## 2019-05-14 RX ORDER — PANTOPRAZOLE SODIUM 20 MG/1
1 TABLET, DELAYED RELEASE ORAL
Qty: 0 | Refills: 0 | DISCHARGE
Start: 2019-05-14

## 2019-05-14 RX ORDER — MONTELUKAST 4 MG/1
1 TABLET, CHEWABLE ORAL
Qty: 0 | Refills: 0 | DISCHARGE

## 2019-05-14 RX ORDER — FLUTICASONE PROPIONATE 50 MCG
1 SPRAY, SUSPENSION NASAL
Qty: 0 | Refills: 0 | DISCHARGE

## 2019-05-14 RX ORDER — OXYCODONE HYDROCHLORIDE 5 MG/1
2 TABLET ORAL
Qty: 60 | Refills: 0
Start: 2019-05-14 | End: 2019-05-20

## 2019-05-14 RX ORDER — APIXABAN 2.5 MG/1
1 TABLET, FILM COATED ORAL
Qty: 0 | Refills: 0 | DISCHARGE
Start: 2019-05-14

## 2019-05-14 RX ORDER — APIXABAN 2.5 MG/1
1 TABLET, FILM COATED ORAL
Qty: 24 | Refills: 0
Start: 2019-05-14 | End: 2019-05-25

## 2019-05-14 RX ORDER — ASPIRIN/CALCIUM CARB/MAGNESIUM 324 MG
1 TABLET ORAL
Qty: 0 | Refills: 0 | DISCHARGE

## 2019-05-14 RX ORDER — PANTOPRAZOLE SODIUM 20 MG/1
1 TABLET, DELAYED RELEASE ORAL
Qty: 30 | Refills: 1
Start: 2019-05-14 | End: 2019-07-12

## 2019-05-14 RX ORDER — ACLIDINIUM BROMIDE 400 UG/1
1 POWDER, METERED RESPIRATORY (INHALATION)
Qty: 0 | Refills: 0 | DISCHARGE

## 2019-05-14 RX ORDER — AMLODIPINE BESYLATE 2.5 MG/1
1 TABLET ORAL
Qty: 0 | Refills: 0 | DISCHARGE

## 2019-05-14 RX ORDER — DOCUSATE SODIUM 100 MG
1 CAPSULE ORAL
Qty: 0 | Refills: 0 | DISCHARGE
Start: 2019-05-14

## 2019-05-14 RX ORDER — CELECOXIB 200 MG/1
1 CAPSULE ORAL
Qty: 0 | Refills: 0 | DISCHARGE
Start: 2019-05-14

## 2019-05-14 RX ORDER — ALBUTEROL 90 UG/1
2 AEROSOL, METERED ORAL
Qty: 0 | Refills: 0 | DISCHARGE

## 2019-05-14 RX ORDER — ACETAMINOPHEN 500 MG
2 TABLET ORAL
Qty: 0 | Refills: 0 | DISCHARGE
Start: 2019-05-14

## 2019-05-14 RX ORDER — DICLOFENAC SODIUM 75 MG/1
50 TABLET, DELAYED RELEASE ORAL
Qty: 0 | Refills: 0 | DISCHARGE

## 2019-05-14 RX ORDER — TAMSULOSIN HYDROCHLORIDE 0.4 MG/1
1 CAPSULE ORAL
Qty: 0 | Refills: 0 | DISCHARGE

## 2019-05-14 RX ORDER — SENNA PLUS 8.6 MG/1
2 TABLET ORAL
Qty: 0 | Refills: 0 | DISCHARGE
Start: 2019-05-14

## 2019-05-14 RX ORDER — LOSARTAN/HYDROCHLOROTHIAZIDE 100MG-25MG
1 TABLET ORAL
Qty: 0 | Refills: 0 | DISCHARGE

## 2019-05-14 RX ORDER — CELECOXIB 200 MG/1
1 CAPSULE ORAL
Qty: 60 | Refills: 0
Start: 2019-05-14 | End: 2019-06-12

## 2019-05-14 RX ADMIN — OXYCODONE HYDROCHLORIDE 10 MILLIGRAM(S): 5 TABLET ORAL at 11:52

## 2019-05-14 RX ADMIN — Medication 400 MILLIGRAM(S): at 03:02

## 2019-05-14 RX ADMIN — AMLODIPINE BESYLATE 10 MILLIGRAM(S): 2.5 TABLET ORAL at 05:54

## 2019-05-14 RX ADMIN — OXYCODONE HYDROCHLORIDE 10 MILLIGRAM(S): 5 TABLET ORAL at 03:35

## 2019-05-14 RX ADMIN — OXYCODONE HYDROCHLORIDE 10 MILLIGRAM(S): 5 TABLET ORAL at 00:30

## 2019-05-14 RX ADMIN — OXYCODONE HYDROCHLORIDE 10 MILLIGRAM(S): 5 TABLET ORAL at 05:54

## 2019-05-14 RX ADMIN — Medication 100 MILLIGRAM(S): at 11:38

## 2019-05-14 RX ADMIN — Medication 81 MILLIGRAM(S): at 05:54

## 2019-05-14 RX ADMIN — OXYCODONE HYDROCHLORIDE 10 MILLIGRAM(S): 5 TABLET ORAL at 17:50

## 2019-05-14 RX ADMIN — CELECOXIB 200 MILLIGRAM(S): 200 CAPSULE ORAL at 21:45

## 2019-05-14 RX ADMIN — TAMSULOSIN HYDROCHLORIDE 0.4 MILLIGRAM(S): 0.4 CAPSULE ORAL at 21:11

## 2019-05-14 RX ADMIN — Medication 1000 MILLIGRAM(S): at 16:11

## 2019-05-14 RX ADMIN — OXYCODONE HYDROCHLORIDE 10 MILLIGRAM(S): 5 TABLET ORAL at 03:02

## 2019-05-14 RX ADMIN — MONTELUKAST 10 MILLIGRAM(S): 4 TABLET, CHEWABLE ORAL at 21:11

## 2019-05-14 RX ADMIN — OXYCODONE HYDROCHLORIDE 10 MILLIGRAM(S): 5 TABLET ORAL at 09:48

## 2019-05-14 RX ADMIN — OXYCODONE HYDROCHLORIDE 10 MILLIGRAM(S): 5 TABLET ORAL at 15:00

## 2019-05-14 RX ADMIN — Medication 1000 MILLIGRAM(S): at 03:43

## 2019-05-14 RX ADMIN — SENNA PLUS 2 TABLET(S): 8.6 TABLET ORAL at 21:11

## 2019-05-14 RX ADMIN — CELECOXIB 200 MILLIGRAM(S): 200 CAPSULE ORAL at 08:21

## 2019-05-14 RX ADMIN — CELECOXIB 200 MILLIGRAM(S): 200 CAPSULE ORAL at 21:11

## 2019-05-14 RX ADMIN — BUDESONIDE AND FORMOTEROL FUMARATE DIHYDRATE 2 PUFF(S): 160; 4.5 AEROSOL RESPIRATORY (INHALATION) at 05:55

## 2019-05-14 RX ADMIN — OXYCODONE HYDROCHLORIDE 10 MILLIGRAM(S): 5 TABLET ORAL at 09:07

## 2019-05-14 RX ADMIN — Medication 1000 MILLIGRAM(S): at 08:22

## 2019-05-14 RX ADMIN — Medication 100 MILLIGRAM(S): at 05:55

## 2019-05-14 RX ADMIN — Medication 1000 MILLIGRAM(S): at 08:21

## 2019-05-14 RX ADMIN — PANTOPRAZOLE SODIUM 40 MILLIGRAM(S): 20 TABLET, DELAYED RELEASE ORAL at 05:55

## 2019-05-14 RX ADMIN — BUDESONIDE AND FORMOTEROL FUMARATE DIHYDRATE 2 PUFF(S): 160; 4.5 AEROSOL RESPIRATORY (INHALATION) at 00:00

## 2019-05-14 RX ADMIN — Medication 100 MILLIGRAM(S): at 21:11

## 2019-05-14 RX ADMIN — OXYCODONE HYDROCHLORIDE 10 MILLIGRAM(S): 5 TABLET ORAL at 06:25

## 2019-05-14 RX ADMIN — APIXABAN 2.5 MILLIGRAM(S): 2.5 TABLET, FILM COATED ORAL at 08:22

## 2019-05-14 RX ADMIN — CELECOXIB 200 MILLIGRAM(S): 200 CAPSULE ORAL at 09:08

## 2019-05-14 RX ADMIN — APIXABAN 2.5 MILLIGRAM(S): 2.5 TABLET, FILM COATED ORAL at 21:11

## 2019-05-14 RX ADMIN — OXYCODONE HYDROCHLORIDE 10 MILLIGRAM(S): 5 TABLET ORAL at 15:10

## 2019-05-14 NOTE — DIETITIAN INITIAL EVALUATION ADULT. - OTHER INFO
74 yo male s/p BL TKR, seen for nutrition assessment per LOS policy.  Pt currently on regular diet, tolerating well throughout admission c reported good appetite and intake.  Meal changes obtained daily to optimize po intake.

## 2019-05-14 NOTE — PROGRESS NOTE ADULT - SUBJECTIVE AND OBJECTIVE BOX
Procedure: Right and Left TKR  POD #: 6 and 1  S: Pt without complaints. No SOB,CP, N/V. Tolerated Diet well.   Pain comfortable  on  Interval Rx.   +BM needed suppository, + flatus, No abdominal pain.  Pain Rx:  acetaminophen   Tablet .. 1000 milliGRAM(s) Oral every 8 hours  celecoxib 200 milliGRAM(s) Oral every 12 hours  ondansetron Injectable 4 milliGRAM(s) IV Push every 6 hours PRN  oxyCODONE    IR 5 milliGRAM(s) Oral every 3 hours PRN  oxyCODONE    IR 10 milliGRAM(s) Oral every 3 hours PRN    O: General: On exam, No Apparent Distress  Vital Signs Last 24 Hrs  T(C): 36.8 (14 May 2019 07:36), Max: 36.9 (13 May 2019 19:18)  T(F): 98.2 (14 May 2019 07:36), Max: 98.4 (13 May 2019 19:18)  HR: 90 (14 May 2019 09:34) (61 - 99)  BP: 144/72 (14 May 2019 07:36) (109/67 - 144/72)  BP(mean): --  RR: 16 (14 May 2019 07:36) (12 - 20)  SpO2: 96% (14 May 2019 09:34) (94% - 97%)    Lungs: BS clear bilat.  Heart: RRRb no murmur  Abdomen: + BS, soft , benign exam     Ext --right knee w/ prineo intact.  ecchymosis lat right thigh and calf. Swelling right knee. left knee w/ dressing dry/intact  ROM: right 0-95, left 0-90, as measured by PT today  Neurologic:  Has sensation over feet & toes bilat. Full AROM bilat feet & toes. EHL/AT = 5/5  Vascular: Feet toes warm, pink. DP = 2+. No calf tenderness bilat..  VTEP: On Bilat. Venodynes + apixaban 2.5 milliGRAM(s) Oral every 12 hours  aspirin enteric coated 81 milliGRAM(s) Oral daily    I&O's Detail    13 May 2019 07:01  -  14 May 2019 07:00  --------------------------------------------------------  IN:    IV PiggyBack: 200 mL    lactated ringers.: 1700 mL  Total IN: 1900 mL    OUT:    Estimated Blood Loss: 100 mL    Voided: 2500 mL  Total OUT: 2600 mL    Total NET: -700 mL          Activity in PT yesterday Noted. Walked 75'  Labs yesterday noted.  Hospitalist input noted.  Labs Today:                         9.0    11.39 )-----------( 233      ( 14 May 2019 07:17 )             28.1       05-14    140  |  106  |  12  ----------------------------<  146<H>  4.3   |  27  |  0.89    Ca    9.1      14 May 2019 07:17        Primary Orthopedic Assessment:  • Stable from Orthopedic perspective  • Neuro motor exam stable  • Labs: CBC / Chem stable--post op anemia--well tolerated      Plan:   • Continue:  PT/OT/WBAT with assistance of a walker/Ice to knee/ Knee ROM         Incentive spirometry encouraged   • Continue DVT prophylaxis as prescribed, including use of compression devices and ankle pumps  • Continue Pain Rx  • Plans per Medicine / Anesthesia / Cardiology  • Discharge planning – anticipated discharge is   Subacute Rehab facility when medically stable & cleared by PT/OT

## 2019-05-14 NOTE — PROGRESS NOTE ADULT - SUBJECTIVE AND OBJECTIVE BOX
CC.   S/P right Total knee replacement staged     HPI Patient reports knee pain controlled.  offers no other complaints                Constitutional: No fever, fatigue or weight loss.  Skin: No rash.  Eyes: No recent vision problems or eye pain.  ENT: No congestion, ear pain, or sore throat.  Endocrine: No thyroid problems.  Cardiovascular: No chest pain or palpation.  Respiratory: No cough, shortness of breath, congestion, or wheezing.  Gastrointestinal: No abdominal pain, nausea, vomiting, or diarrhea.  Genitourinary: No dysuria.  Musculoskeletal: No joint swelling.  Neurologic: No headache.      Vital Signs Last 24 Hrs  T(C): 36.8 (05-14-19 @ 07:36), Max: 36.9 (05-13-19 @ 19:18)  T(F): 98.2 (05-14-19 @ 07:36), Max: 98.4 (05-13-19 @ 19:18)  HR: 90 (05-14-19 @ 09:34) (61 - 99)  BP: 144/72 (05-14-19 @ 07:36) (109/67 - 144/72)  BP(mean): --  RR: 16 (05-14-19 @ 07:36) (12 - 20)  SpO2: 96% (05-14-19 @ 09:34) (94% - 97%)        PHYSICAL EXAM-  GENERAL: NAD, well-groomed, well-developed  HEAD:  Atraumatic, Normocephalic  EYES: EOMI, PERRLA, conjunctiva and sclera clear  NECK: Supple, No JVD, Normal thyroid  NERVOUS SYSTEM:  Alert & Oriented X3, Motor Strength 5/5 B/L upper and lower extremities; DTRs 2+ intact and symmetric  CHEST/LUNG: Clear to percussion bilaterally; No rales, rhonchi, wheezing, or rubs  HEART: Regular rate and rhythm; No murmurs, rubs, or gallops  ABDOMEN: Soft, Nontender, Nondistended; Bowel sounds present  EXTREMITIES:  2+ Peripheral Pulses, No clubbing, cyanosis, or edema  SKIN: No rashes or lesions                                  9.0    11.39 )-----------( 233      ( 14 May 2019 07:17 )             28.1     05-14    140  |  106  |  12  ----------------------------<  146<H>  4.3   |  27  |  0.89    Ca    9.1      14 May 2019 07:17                      MEDICATIONS  (STANDING):  acetaminophen   Tablet .. 1000 milliGRAM(s) Oral every 8 hours  amLODIPine   Tablet 10 milliGRAM(s) Oral daily  apixaban 2.5 milliGRAM(s) Oral every 12 hours  aspirin enteric coated 81 milliGRAM(s) Oral daily  buDESOnide 160 MICROgram(s)/formoterol 4.5 MICROgram(s) Inhaler 2 Puff(s) Inhalation two times a day  celecoxib 200 milliGRAM(s) Oral every 12 hours  docusate sodium 100 milliGRAM(s) Oral three times a day  montelukast 10 milliGRAM(s) Oral daily  pantoprazole    Tablet 40 milliGRAM(s) Oral before breakfast  senna 2 Tablet(s) Oral at bedtime  tamsulosin 0.4 milliGRAM(s) Oral at bedtime    MEDICATIONS  (PRN):  ALBUTerol    90 MICROgram(s) HFA Inhaler 2 Puff(s) Inhalation every 6 hours PRN for allergy symptoms  aluminum hydroxide/magnesium hydroxide/simethicone Suspension 30 milliLiter(s) Oral four times a day PRN Indigestion  fluticasone propionate 50 MICROgram(s)/spray Nasal Spray 1 Spray(s) Both Nostrils two times a day PRN allergies  HYDROmorphone  Injectable 0.5 milliGRAM(s) IV Push every 3 hours PRN Severe Pain (7 - 10)  magnesium hydroxide Suspension 30 milliLiter(s) Oral daily PRN Constipation  ondansetron Injectable 4 milliGRAM(s) IV Push every 6 hours PRN Nausea and/or Vomiting  oxyCODONE    IR 5 milliGRAM(s) Oral every 3 hours PRN Mild Pain (1 - 3)  oxyCODONE    IR 10 milliGRAM(s) Oral every 3 hours PRN Moderate Pain (4 - 6)  polyethylene glycol 3350 17 Gram(s) Oral daily PRN Constipation      Imaging Personally Reviewed:     [x ] YES  [ ] NO    Consultant(s) Notes Reviewed:  [x ] YES  [ ] NO    Care Discussed with Consultants/Other Providers [x ] YES  [ ] No medical contraindication for discharge

## 2019-05-15 ENCOUNTER — TRANSCRIPTION ENCOUNTER (OUTPATIENT)
Age: 74
End: 2019-05-15

## 2019-05-15 LAB
ANION GAP SERPL CALC-SCNC: 6 MMOL/L — SIGNIFICANT CHANGE UP (ref 5–17)
BUN SERPL-MCNC: 18 MG/DL — SIGNIFICANT CHANGE UP (ref 7–23)
CALCIUM SERPL-MCNC: 8.5 MG/DL — SIGNIFICANT CHANGE UP (ref 8.4–10.5)
CHLORIDE SERPL-SCNC: 105 MMOL/L — SIGNIFICANT CHANGE UP (ref 96–108)
CO2 SERPL-SCNC: 30 MMOL/L — SIGNIFICANT CHANGE UP (ref 22–31)
CREAT SERPL-MCNC: 1 MG/DL — SIGNIFICANT CHANGE UP (ref 0.5–1.3)
GLUCOSE SERPL-MCNC: 112 MG/DL — HIGH (ref 70–99)
HCT VFR BLD CALC: 25.2 % — LOW (ref 39–50)
HGB BLD-MCNC: 8.2 G/DL — LOW (ref 13–17)
MCHC RBC-ENTMCNC: 31.9 PG — SIGNIFICANT CHANGE UP (ref 27–34)
MCHC RBC-ENTMCNC: 32.5 GM/DL — SIGNIFICANT CHANGE UP (ref 32–36)
MCV RBC AUTO: 98.1 FL — SIGNIFICANT CHANGE UP (ref 80–100)
NRBC # BLD: 0 /100 WBCS — SIGNIFICANT CHANGE UP (ref 0–0)
PLATELET # BLD AUTO: 235 K/UL — SIGNIFICANT CHANGE UP (ref 150–400)
POTASSIUM SERPL-MCNC: 3.8 MMOL/L — SIGNIFICANT CHANGE UP (ref 3.5–5.3)
POTASSIUM SERPL-SCNC: 3.8 MMOL/L — SIGNIFICANT CHANGE UP (ref 3.5–5.3)
RBC # BLD: 2.57 M/UL — LOW (ref 4.2–5.8)
RBC # FLD: 13.6 % — SIGNIFICANT CHANGE UP (ref 10.3–14.5)
SODIUM SERPL-SCNC: 141 MMOL/L — SIGNIFICANT CHANGE UP (ref 135–145)
WBC # BLD: 9.3 K/UL — SIGNIFICANT CHANGE UP (ref 3.8–10.5)
WBC # FLD AUTO: 9.3 K/UL — SIGNIFICANT CHANGE UP (ref 3.8–10.5)

## 2019-05-15 PROCEDURE — 99232 SBSQ HOSP IP/OBS MODERATE 35: CPT

## 2019-05-15 RX ORDER — FERROUS SULFATE 325(65) MG
325 TABLET ORAL DAILY
Refills: 0 | Status: DISCONTINUED | OUTPATIENT
Start: 2019-05-15 | End: 2019-05-16

## 2019-05-15 RX ADMIN — CELECOXIB 200 MILLIGRAM(S): 200 CAPSULE ORAL at 22:00

## 2019-05-15 RX ADMIN — OXYCODONE HYDROCHLORIDE 10 MILLIGRAM(S): 5 TABLET ORAL at 12:10

## 2019-05-15 RX ADMIN — OXYCODONE HYDROCHLORIDE 10 MILLIGRAM(S): 5 TABLET ORAL at 21:24

## 2019-05-15 RX ADMIN — OXYCODONE HYDROCHLORIDE 10 MILLIGRAM(S): 5 TABLET ORAL at 10:32

## 2019-05-15 RX ADMIN — SENNA PLUS 2 TABLET(S): 8.6 TABLET ORAL at 21:24

## 2019-05-15 RX ADMIN — OXYCODONE HYDROCHLORIDE 10 MILLIGRAM(S): 5 TABLET ORAL at 15:12

## 2019-05-15 RX ADMIN — Medication 100 MILLIGRAM(S): at 11:03

## 2019-05-15 RX ADMIN — APIXABAN 2.5 MILLIGRAM(S): 2.5 TABLET, FILM COATED ORAL at 09:16

## 2019-05-15 RX ADMIN — TAMSULOSIN HYDROCHLORIDE 0.4 MILLIGRAM(S): 0.4 CAPSULE ORAL at 21:24

## 2019-05-15 RX ADMIN — Medication 81 MILLIGRAM(S): at 05:53

## 2019-05-15 RX ADMIN — OXYCODONE HYDROCHLORIDE 10 MILLIGRAM(S): 5 TABLET ORAL at 00:27

## 2019-05-15 RX ADMIN — OXYCODONE HYDROCHLORIDE 10 MILLIGRAM(S): 5 TABLET ORAL at 09:15

## 2019-05-15 RX ADMIN — Medication 1000 MILLIGRAM(S): at 15:12

## 2019-05-15 RX ADMIN — BUDESONIDE AND FORMOTEROL FUMARATE DIHYDRATE 2 PUFF(S): 160; 4.5 AEROSOL RESPIRATORY (INHALATION) at 21:24

## 2019-05-15 RX ADMIN — OXYCODONE HYDROCHLORIDE 10 MILLIGRAM(S): 5 TABLET ORAL at 18:00

## 2019-05-15 RX ADMIN — OXYCODONE HYDROCHLORIDE 10 MILLIGRAM(S): 5 TABLET ORAL at 01:00

## 2019-05-15 RX ADMIN — OXYCODONE HYDROCHLORIDE 10 MILLIGRAM(S): 5 TABLET ORAL at 15:55

## 2019-05-15 RX ADMIN — PANTOPRAZOLE SODIUM 40 MILLIGRAM(S): 20 TABLET, DELAYED RELEASE ORAL at 05:45

## 2019-05-15 RX ADMIN — APIXABAN 2.5 MILLIGRAM(S): 2.5 TABLET, FILM COATED ORAL at 21:24

## 2019-05-15 RX ADMIN — CELECOXIB 200 MILLIGRAM(S): 200 CAPSULE ORAL at 21:24

## 2019-05-15 RX ADMIN — Medication 100 MILLIGRAM(S): at 21:24

## 2019-05-15 RX ADMIN — AMLODIPINE BESYLATE 10 MILLIGRAM(S): 2.5 TABLET ORAL at 05:45

## 2019-05-15 RX ADMIN — Medication 1000 MILLIGRAM(S): at 09:15

## 2019-05-15 RX ADMIN — OXYCODONE HYDROCHLORIDE 10 MILLIGRAM(S): 5 TABLET ORAL at 22:00

## 2019-05-15 RX ADMIN — Medication 1000 MILLIGRAM(S): at 01:00

## 2019-05-15 RX ADMIN — Medication 100 MILLIGRAM(S): at 05:45

## 2019-05-15 RX ADMIN — Medication 1000 MILLIGRAM(S): at 00:29

## 2019-05-15 RX ADMIN — BUDESONIDE AND FORMOTEROL FUMARATE DIHYDRATE 2 PUFF(S): 160; 4.5 AEROSOL RESPIRATORY (INHALATION) at 06:57

## 2019-05-15 RX ADMIN — CELECOXIB 200 MILLIGRAM(S): 200 CAPSULE ORAL at 10:32

## 2019-05-15 RX ADMIN — CELECOXIB 200 MILLIGRAM(S): 200 CAPSULE ORAL at 09:16

## 2019-05-15 RX ADMIN — Medication 325 MILLIGRAM(S): at 15:12

## 2019-05-15 RX ADMIN — MONTELUKAST 10 MILLIGRAM(S): 4 TABLET, CHEWABLE ORAL at 21:24

## 2019-05-15 RX ADMIN — Medication 1000 MILLIGRAM(S): at 10:32

## 2019-05-15 RX ADMIN — OXYCODONE HYDROCHLORIDE 10 MILLIGRAM(S): 5 TABLET ORAL at 06:17

## 2019-05-15 RX ADMIN — OXYCODONE HYDROCHLORIDE 10 MILLIGRAM(S): 5 TABLET ORAL at 05:45

## 2019-05-15 RX ADMIN — OXYCODONE HYDROCHLORIDE 10 MILLIGRAM(S): 5 TABLET ORAL at 18:01

## 2019-05-15 NOTE — PROGRESS NOTE ADULT - SUBJECTIVE AND OBJECTIVE BOX
CC.   S/P right Total knee replacement staged     HPI Patient reports knee pain controlled.  offers no other complaints                Constitutional: No fever, fatigue or weight loss.  Skin: No rash.  Eyes: No recent vision problems or eye pain.  ENT: No congestion, ear pain, or sore throat.  Endocrine: No thyroid problems.  Cardiovascular: No chest pain or palpation.  Respiratory: No cough, shortness of breath, congestion, or wheezing.  Gastrointestinal: No abdominal pain, nausea, vomiting, or diarrhea.  Genitourinary: No dysuria.  Musculoskeletal: No joint swelling.  Neurologic: No headache.    Vital Signs Last 24 Hrs  T(C): 37.1 (15 May 2019 07:04), Max: 37.1 (15 May 2019 07:04)  T(F): 98.8 (15 May 2019 07:04), Max: 98.8 (15 May 2019 07:04)  HR: 90 (15 May 2019 09:00) (57 - 90)  BP: 130/71 (15 May 2019 07:04) (109/64 - 147/76)  BP(mean): --  RR: 15 (15 May 2019 07:04) (15 - 18)  SpO2: 95% (15 May 2019 09:00) (93% - 96%)      PHYSICAL EXAM-  GENERAL: NAD, well-groomed, well-developed  HEAD:  Atraumatic, Normocephalic  EYES: EOMI, PERRLA, conjunctiva and sclera clear  NECK: Supple, No JVD, Normal thyroid  NERVOUS SYSTEM:  Alert & Oriented X3, Motor Strength 5/5 B/L upper and lower extremities; DTRs 2+ intact and symmetric  CHEST/LUNG: Clear to percussion bilaterally; No rales, rhonchi, wheezing, or rubs  HEART: Regular rate and rhythm; No murmurs, rubs, or gallops  ABDOMEN: Soft, Nontender, Nondistended; Bowel sounds present  EXTREMITIES:  2+ Peripheral Pulses, No clubbing, cyanosis, or edema  SKIN: No rashes or lesions                                 8.2    9.30  )-----------( 235      ( 15 May 2019 08:17 )             25.2     05-15    141  |  105  |  18  ----------------------------<  112<H>  3.8   |  30  |  1.00    Ca    8.5      15 May 2019 08:17              MEDICATIONS  (STANDING):  acetaminophen   Tablet .. 1000 milliGRAM(s) Oral every 8 hours  amLODIPine   Tablet 10 milliGRAM(s) Oral daily  apixaban 2.5 milliGRAM(s) Oral every 12 hours  aspirin enteric coated 81 milliGRAM(s) Oral daily  buDESOnide 160 MICROgram(s)/formoterol 4.5 MICROgram(s) Inhaler 2 Puff(s) Inhalation two times a day  celecoxib 200 milliGRAM(s) Oral every 12 hours  docusate sodium 100 milliGRAM(s) Oral three times a day  ferrous    sulfate 325 milliGRAM(s) Oral daily  losartan 100 milliGRAM(s) Oral daily  montelukast 10 milliGRAM(s) Oral daily  pantoprazole    Tablet 40 milliGRAM(s) Oral before breakfast  senna 2 Tablet(s) Oral at bedtime  tamsulosin 0.4 milliGRAM(s) Oral at bedtime    MEDICATIONS  (PRN):  ALBUTerol    90 MICROgram(s) HFA Inhaler 2 Puff(s) Inhalation every 6 hours PRN for allergy symptoms  aluminum hydroxide/magnesium hydroxide/simethicone Suspension 30 milliLiter(s) Oral four times a day PRN Indigestion  bisacodyl Suppository 10 milliGRAM(s) Rectal daily PRN If no bowel movement by postoperative day #2  fluticasone propionate 50 MICROgram(s)/spray Nasal Spray 1 Spray(s) Both Nostrils two times a day PRN allergies  HYDROmorphone  Injectable 0.5 milliGRAM(s) IV Push every 3 hours PRN Severe Pain (7 - 10)  magnesium hydroxide Suspension 30 milliLiter(s) Oral daily PRN Constipation  ondansetron Injectable 4 milliGRAM(s) IV Push every 6 hours PRN Nausea and/or Vomiting  oxyCODONE    IR 5 milliGRAM(s) Oral every 3 hours PRN Mild Pain (1 - 3)  oxyCODONE    IR 10 milliGRAM(s) Oral every 3 hours PRN Moderate Pain (4 - 6)  polyethylene glycol 3350 17 Gram(s) Oral daily PRN Constipation      Imaging Personally Reviewed:     [x ] YES  [ ] NO    Consultant(s) Notes Reviewed:  [x ] YES  [ ] NO    Care Discussed with Consultants/Other Providers [x ] YES  [ ] No medical contraindication for discharge

## 2019-05-15 NOTE — DISCHARGE NOTE NURSING/CASE MANAGEMENT/SOCIAL WORK - NSDCPEEMAIL_GEN_ALL_CORE
[facility] for Tobacco Control email [email or web address]

## 2019-05-15 NOTE — PROGRESS NOTE ADULT - SUBJECTIVE AND OBJECTIVE BOX
POD#:  2 S/P: Left TKR, POD #7 s/p Right TKR    SUBJECTIVE: Patient seen and examined.  States he is feeling fairly significant pain.     Reported Pain Score = 8 (receiving pain meds)    OBJECTIVE:     Vital Signs Last 24 Hrs  T(C): 37.1 (15 May 2019 07:04), Max: 37.1 (15 May 2019 07:04)  T(F): 98.8 (15 May 2019 07:04), Max: 98.8 (15 May 2019 07:04)  HR: 90 (15 May 2019 09:00) (57 - 90)  BP: 130/71 (15 May 2019 07:04) (109/64 - 147/76)  RR: 15 (15 May 2019 07:04) (15 - 18)  SpO2: 95% (15 May 2019 09:00) (93% - 96%)    Left Knee:          Dressing removed: incision clean/dry/intact, Prineo tape in place  Right Knee: incision CDI, Prineo tape in place, + mild-moderate swelling of thigh, knee, leg with resolving ecchymosis  Bilateral LEs:         Sensation:  intact to light touch          Motor exam:  5/5 dorsiflexion/plantarflexion/EHL          2+ DP pulses          calf supple, NT         SCDs in place    LABS:                        8.2    9.30  )-----------( 235      ( 15 May 2019 08:17 )             25.2     05-15    141  |  105  |  18  ----------------------------<  112<H>  3.8   |  30  |  1.00    Ca    8.5      15 May 2019 08:17            MEDICATIONS:  Anticoagulation:  apixaban 2.5 milliGRAM(s) Oral every 12 hours  aspirin enteric coated 81 milliGRAM(s) Oral daily      Pain medications:   acetaminophen   Tablet .. 1000 milliGRAM(s) Oral every 8 hours  celecoxib 200 milliGRAM(s) Oral every 12 hours  HYDROmorphone  Injectable 0.5 milliGRAM(s) IV Push every 3 hours PRN  oxyCODONE    IR 5 milliGRAM(s) Oral every 3 hours PRN  oxyCODONE    IR 10 milliGRAM(s) Oral every 3 hours PRN        A/P : Patient stable s/p Left TKR POD # 2, Right TKR POD #7  -    mild post-op anemia  -    Pain control  -    DVT ppx: Eliquis 2.5 mg q 12 h, ASA 81mg daily  -    Weight bearing status: WBAT   -    Physical Therapy  -    Occupational Therapy  -    Discharge plan: Home tomorrow

## 2019-05-15 NOTE — DISCHARGE NOTE NURSING/CASE MANAGEMENT/SOCIAL WORK - NSDCPEWEB_GEN_ALL_CORE
[place] website --- [email or web address] Tobacco [email or web address] website --- [email or web address]

## 2019-05-15 NOTE — DISCHARGE NOTE NURSING/CASE MANAGEMENT/SOCIAL WORK - NSDCDPATPORTLINK_GEN_ALL_CORE
You can access the [facility] Portal, offered by [facility], by registering with the following website: [email or web address]

## 2019-05-15 NOTE — DISCHARGE NOTE NURSING/CASE MANAGEMENT/SOCIAL WORK - NSSCNAMETXT_GEN_ALL_CORE
[facility] .Please contact the home care agency  if you have not heard from them by 12 noon on the day after your hospital discharge. [facility]. Please contact the home care agency  if you have not heard from them by 12 noon on the day after your hospital discharge.

## 2019-05-16 VITALS
TEMPERATURE: 99 F | OXYGEN SATURATION: 94 % | HEART RATE: 84 BPM | RESPIRATION RATE: 18 BRPM | SYSTOLIC BLOOD PRESSURE: 123 MMHG | DIASTOLIC BLOOD PRESSURE: 63 MMHG

## 2019-05-16 LAB
ANION GAP SERPL CALC-SCNC: 5 MMOL/L — SIGNIFICANT CHANGE UP (ref 5–17)
BUN SERPL-MCNC: 17 MG/DL — SIGNIFICANT CHANGE UP (ref 7–23)
CALCIUM SERPL-MCNC: 8.4 MG/DL — SIGNIFICANT CHANGE UP (ref 8.4–10.5)
CHLORIDE SERPL-SCNC: 104 MMOL/L — SIGNIFICANT CHANGE UP (ref 96–108)
CO2 SERPL-SCNC: 30 MMOL/L — SIGNIFICANT CHANGE UP (ref 22–31)
CREAT SERPL-MCNC: 1.01 MG/DL — SIGNIFICANT CHANGE UP (ref 0.5–1.3)
GLUCOSE SERPL-MCNC: 117 MG/DL — HIGH (ref 70–99)
HCT VFR BLD CALC: 25.9 % — LOW (ref 39–50)
HGB BLD-MCNC: 8.2 G/DL — LOW (ref 13–17)
MCHC RBC-ENTMCNC: 31.3 PG — SIGNIFICANT CHANGE UP (ref 27–34)
MCHC RBC-ENTMCNC: 31.7 GM/DL — LOW (ref 32–36)
MCV RBC AUTO: 98.9 FL — SIGNIFICANT CHANGE UP (ref 80–100)
NRBC # BLD: 0 /100 WBCS — SIGNIFICANT CHANGE UP (ref 0–0)
PLATELET # BLD AUTO: 247 K/UL — SIGNIFICANT CHANGE UP (ref 150–400)
POTASSIUM SERPL-MCNC: 4.1 MMOL/L — SIGNIFICANT CHANGE UP (ref 3.5–5.3)
POTASSIUM SERPL-SCNC: 4.1 MMOL/L — SIGNIFICANT CHANGE UP (ref 3.5–5.3)
RBC # BLD: 2.62 M/UL — LOW (ref 4.2–5.8)
RBC # FLD: 13.9 % — SIGNIFICANT CHANGE UP (ref 10.3–14.5)
SODIUM SERPL-SCNC: 139 MMOL/L — SIGNIFICANT CHANGE UP (ref 135–145)
WBC # BLD: 9.66 K/UL — SIGNIFICANT CHANGE UP (ref 3.8–10.5)
WBC # FLD AUTO: 9.66 K/UL — SIGNIFICANT CHANGE UP (ref 3.8–10.5)

## 2019-05-16 PROCEDURE — C1713: CPT

## 2019-05-16 PROCEDURE — 99232 SBSQ HOSP IP/OBS MODERATE 35: CPT

## 2019-05-16 PROCEDURE — 88305 TISSUE EXAM BY PATHOLOGIST: CPT

## 2019-05-16 PROCEDURE — 97164 PT RE-EVAL EST PLAN CARE: CPT

## 2019-05-16 PROCEDURE — 93970 EXTREMITY STUDY: CPT

## 2019-05-16 PROCEDURE — 97116 GAIT TRAINING THERAPY: CPT

## 2019-05-16 PROCEDURE — 97168 OT RE-EVAL EST PLAN CARE: CPT

## 2019-05-16 PROCEDURE — 97110 THERAPEUTIC EXERCISES: CPT

## 2019-05-16 PROCEDURE — 36415 COLL VENOUS BLD VENIPUNCTURE: CPT

## 2019-05-16 PROCEDURE — C1889: CPT

## 2019-05-16 PROCEDURE — 85027 COMPLETE CBC AUTOMATED: CPT

## 2019-05-16 PROCEDURE — 85730 THROMBOPLASTIN TIME PARTIAL: CPT

## 2019-05-16 PROCEDURE — 86901 BLOOD TYPING SEROLOGIC RH(D): CPT

## 2019-05-16 PROCEDURE — 97530 THERAPEUTIC ACTIVITIES: CPT

## 2019-05-16 PROCEDURE — 97535 SELF CARE MNGMENT TRAINING: CPT

## 2019-05-16 PROCEDURE — 88311 DECALCIFY TISSUE: CPT

## 2019-05-16 PROCEDURE — C1776: CPT

## 2019-05-16 PROCEDURE — 94664 DEMO&/EVAL PT USE INHALER: CPT

## 2019-05-16 PROCEDURE — 94640 AIRWAY INHALATION TREATMENT: CPT

## 2019-05-16 PROCEDURE — 97161 PT EVAL LOW COMPLEX 20 MIN: CPT

## 2019-05-16 PROCEDURE — 86900 BLOOD TYPING SEROLOGIC ABO: CPT

## 2019-05-16 PROCEDURE — 85014 HEMATOCRIT: CPT

## 2019-05-16 PROCEDURE — 97165 OT EVAL LOW COMPLEX 30 MIN: CPT

## 2019-05-16 PROCEDURE — 85018 HEMOGLOBIN: CPT

## 2019-05-16 PROCEDURE — 73562 X-RAY EXAM OF KNEE 3: CPT

## 2019-05-16 PROCEDURE — 86850 RBC ANTIBODY SCREEN: CPT

## 2019-05-16 PROCEDURE — 80048 BASIC METABOLIC PNL TOTAL CA: CPT

## 2019-05-16 PROCEDURE — 85610 PROTHROMBIN TIME: CPT

## 2019-05-16 RX ORDER — POLYETHYLENE GLYCOL 3350 17 G/17G
17 POWDER, FOR SOLUTION ORAL
Qty: 0 | Refills: 0 | DISCHARGE
Start: 2019-05-16

## 2019-05-16 RX ORDER — FERROUS SULFATE 325(65) MG
1 TABLET ORAL
Qty: 0 | Refills: 0 | DISCHARGE
Start: 2019-05-16

## 2019-05-16 RX ADMIN — MONTELUKAST 10 MILLIGRAM(S): 4 TABLET, CHEWABLE ORAL at 12:29

## 2019-05-16 RX ADMIN — OXYCODONE HYDROCHLORIDE 10 MILLIGRAM(S): 5 TABLET ORAL at 09:33

## 2019-05-16 RX ADMIN — CELECOXIB 200 MILLIGRAM(S): 200 CAPSULE ORAL at 09:37

## 2019-05-16 RX ADMIN — OXYCODONE HYDROCHLORIDE 10 MILLIGRAM(S): 5 TABLET ORAL at 10:03

## 2019-05-16 RX ADMIN — Medication 100 MILLIGRAM(S): at 05:23

## 2019-05-16 RX ADMIN — OXYCODONE HYDROCHLORIDE 10 MILLIGRAM(S): 5 TABLET ORAL at 05:23

## 2019-05-16 RX ADMIN — Medication 81 MILLIGRAM(S): at 05:23

## 2019-05-16 RX ADMIN — APIXABAN 2.5 MILLIGRAM(S): 2.5 TABLET, FILM COATED ORAL at 09:33

## 2019-05-16 RX ADMIN — AMLODIPINE BESYLATE 10 MILLIGRAM(S): 2.5 TABLET ORAL at 05:23

## 2019-05-16 RX ADMIN — Medication 1000 MILLIGRAM(S): at 09:33

## 2019-05-16 RX ADMIN — OXYCODONE HYDROCHLORIDE 10 MILLIGRAM(S): 5 TABLET ORAL at 12:59

## 2019-05-16 RX ADMIN — OXYCODONE HYDROCHLORIDE 10 MILLIGRAM(S): 5 TABLET ORAL at 12:29

## 2019-05-16 RX ADMIN — OXYCODONE HYDROCHLORIDE 10 MILLIGRAM(S): 5 TABLET ORAL at 01:00

## 2019-05-16 RX ADMIN — BUDESONIDE AND FORMOTEROL FUMARATE DIHYDRATE 2 PUFF(S): 160; 4.5 AEROSOL RESPIRATORY (INHALATION) at 09:36

## 2019-05-16 RX ADMIN — Medication 1000 MILLIGRAM(S): at 00:29

## 2019-05-16 RX ADMIN — PANTOPRAZOLE SODIUM 40 MILLIGRAM(S): 20 TABLET, DELAYED RELEASE ORAL at 05:23

## 2019-05-16 RX ADMIN — CELECOXIB 200 MILLIGRAM(S): 200 CAPSULE ORAL at 09:33

## 2019-05-16 RX ADMIN — Medication 325 MILLIGRAM(S): at 12:29

## 2019-05-16 RX ADMIN — OXYCODONE HYDROCHLORIDE 10 MILLIGRAM(S): 5 TABLET ORAL at 00:29

## 2019-05-16 RX ADMIN — Medication 1 SPRAY(S): at 09:36

## 2019-05-16 RX ADMIN — OXYCODONE HYDROCHLORIDE 10 MILLIGRAM(S): 5 TABLET ORAL at 06:36

## 2019-05-16 RX ADMIN — Medication 1000 MILLIGRAM(S): at 09:37

## 2019-05-16 RX ADMIN — Medication 1000 MILLIGRAM(S): at 01:00

## 2019-05-16 NOTE — PROGRESS NOTE ADULT - SUBJECTIVE AND OBJECTIVE BOX
CC.   S/P right Total knee replacement staged     HPI Patient reports knee pain controlled.  offers no other complaints                Constitutional: No fever, fatigue or weight loss.  Skin: No rash.  Eyes: No recent vision problems or eye pain.  ENT: No congestion, ear pain, or sore throat.  Endocrine: No thyroid problems.  Cardiovascular: No chest pain or palpation.  Respiratory: No cough, shortness of breath, congestion, or wheezing.  Gastrointestinal: No abdominal pain, nausea, vomiting, or diarrhea.  Genitourinary: No dysuria.  Musculoskeletal: No joint swelling.  Neurologic: No headache.    Vital Signs Last 24 Hrs  T(C): 36.9 (16 May 2019 07:57), Max: 37.2 (15 May 2019 15:22)  T(F): 98.5 (16 May 2019 07:57), Max: 98.9 (15 May 2019 15:22)  HR: 59 (16 May 2019 07:57) (59 - 86)  BP: 103/59 (16 May 2019 07:57) (103/59 - 127/73)  BP(mean): --  RR: 16 (16 May 2019 07:57) (16 - 17)  SpO2: 92% (16 May 2019 07:57) (92% - 96%)      PHYSICAL EXAM-  GENERAL: NAD, well-groomed, well-developed  HEAD:  Atraumatic, Normocephalic  EYES: EOMI, PERRLA, conjunctiva and sclera clear  NECK: Supple, No JVD, Normal thyroid  NERVOUS SYSTEM:  Alert & Oriented X3, Motor Strength 5/5 B/L upper and lower extremities; DTRs 2+ intact and symmetric  CHEST/LUNG: Clear to percussion bilaterally; No rales, rhonchi, wheezing, or rubs  HEART: Regular rate and rhythm; No murmurs, rubs, or gallops  ABDOMEN: Soft, Nontender, Nondistended; Bowel sounds present  EXTREMITIES:  2+ Peripheral Pulses, No clubbing, cyanosis, or edema  SKIN: No rashes or lesions                             8.2    9.66  )-----------( 247      ( 16 May 2019 07:24 )             25.9     05-16    139  |  104  |  17  ----------------------------<  117<H>  4.1   |  30  |  1.01    Ca    8.4      16 May 2019 07:24              MEDICATIONS  (STANDING):  acetaminophen   Tablet .. 1000 milliGRAM(s) Oral every 8 hours  amLODIPine   Tablet 10 milliGRAM(s) Oral daily  apixaban 2.5 milliGRAM(s) Oral every 12 hours  aspirin enteric coated 81 milliGRAM(s) Oral daily  buDESOnide 160 MICROgram(s)/formoterol 4.5 MICROgram(s) Inhaler 2 Puff(s) Inhalation two times a day  celecoxib 200 milliGRAM(s) Oral every 12 hours  docusate sodium 100 milliGRAM(s) Oral three times a day  ferrous    sulfate 325 milliGRAM(s) Oral daily  losartan 100 milliGRAM(s) Oral daily  montelukast 10 milliGRAM(s) Oral daily  pantoprazole    Tablet 40 milliGRAM(s) Oral before breakfast  senna 2 Tablet(s) Oral at bedtime  tamsulosin 0.4 milliGRAM(s) Oral at bedtime    MEDICATIONS  (PRN):  ALBUTerol    90 MICROgram(s) HFA Inhaler 2 Puff(s) Inhalation every 6 hours PRN for allergy symptoms  aluminum hydroxide/magnesium hydroxide/simethicone Suspension 30 milliLiter(s) Oral four times a day PRN Indigestion  bisacodyl Suppository 10 milliGRAM(s) Rectal daily PRN If no bowel movement by postoperative day #2  fluticasone propionate 50 MICROgram(s)/spray Nasal Spray 1 Spray(s) Both Nostrils two times a day PRN allergies  HYDROmorphone  Injectable 0.5 milliGRAM(s) IV Push every 3 hours PRN Severe Pain (7 - 10)  magnesium hydroxide Suspension 30 milliLiter(s) Oral daily PRN Constipation  ondansetron Injectable 4 milliGRAM(s) IV Push every 6 hours PRN Nausea and/or Vomiting  oxyCODONE    IR 5 milliGRAM(s) Oral every 3 hours PRN Mild Pain (1 - 3)  oxyCODONE    IR 10 milliGRAM(s) Oral every 3 hours PRN Moderate Pain (4 - 6)  polyethylene glycol 3350 17 Gram(s) Oral daily PRN Constipation      Imaging Personally Reviewed:     [x ] YES  [ ] NO    Consultant(s) Notes Reviewed:  [x ] YES  [ ] NO    Care Discussed with Consultants/Other Providers [x ] YES  [ ] No medical contraindication for discharge

## 2019-05-16 NOTE — PROGRESS NOTE ADULT - ASSESSMENT
s/p Stage 1 RIGHT Total knee replacement  - Pain control  - Bowel regimen  - PT/OT  - VTE PPx - Lovenox until stage 2    HTN  Continue with home medications, and Monitor BP    Chronic Aspirin Use  - for primary prevention  - Stress Test 4/2019 - no evidence for ischemia    Asthmatic Bronchitis  - use Symbicort 160/4.5 2 puffs daily   - continue Ventolin PRN, Montelukast, Fluticasone    BPH  - continue Tamsulosin  medically optimized for surgery
POD#1 s/p Stage 1 RIGHT TKR  - Pain control  - Bowel regimen  - PT/OT  - VTE PPx - Lovenox until stage 2    HTN  - continue amlodipine with hold parameters  - hold diuretics until discharge, confirmed with patient he takes both HCTZ (part of combo pill) and chlorthalidone  - resume ARB on POD#2    Chronic Aspirin Use  - for primary prevention  - Stress Test 4/2019 - no evidence for ischemia    Asthmatic Bronchitis  - use Symbicort 160/4.5 2 puffs daily instead of Trelegy   - continue Ventolin PRN, Montelukast, Fluticasone    BPH  - continue Tamsulosin
POD#2 s/p Stage 1 RIGHT TKR  - Pain control  - Bowel regimen  - PT/OT  - VTE PPx - Lovenox until stage 2    HTN  - continue amlodipine with hold parameters  - hold diuretics until discharge, confirmed with patient he takes both HCTZ (part of combo pill) and chlorthalidone  - resume ARB on POD#2    Chronic Aspirin Use  - for primary prevention  - Stress Test 4/2019 - no evidence for ischemia    Asthmatic Bronchitis  - use Symbicort 160/4.5 2 puffs daily instead of Trelegy   - continue Ventolin PRN, Montelukast, Fluticasone    BPH  - continue Tamsulosin
Presurgical evaluation:  1.	IV TXA  2.	Acetaminophen and celecoxib for multimodal pain management  3.	VTE prophylaxis: Enoxaparin 30mg q12h beginning POD1 after first side and Eliquis 2.5mg q12hx 12 days followed by ASA EC 81mg Qday x 4 weeks after second side  4.	Resume ASA EC 81mg QDay POD1 after second side  5.	Caution with PRN narcotics as patient has remote history of drug abuse  6.	CIWA scale if needed  7.	I contacted patient about his Trelegy inhaler – he cannot afford it and has a call out to his prescriber to change it to something else. While admitted: Symbicort inhaler 160/4.5 – 2 puffs daily. Also recommend albuterol-ipratropium nebs q6h PRN.
s/p Stage 1 RIGHT Total knee replacement  - Pain control  - Bowel regimen  - PT/OT  - VTE PPx - Lovenox until stage 2    HTN  Continue with home medications, and Monitor BP    Chronic Aspirin Use  - for primary prevention  - Stress Test 4/2019 - no evidence for ischemia    Asthmatic Bronchitis  - use Symbicort 160/4.5 2 puffs daily   - continue Ventolin PRN, Montelukast, Fluticasone    BPH  - continue Tamsulosin
s/p Staged Total knee replacement   - Pain control  - Bowel regimen  - PT/OT  - VTE PPx per protocol    HTN  Blood pressure meds with hold parameters     Asthmatic Bronchitis  - use Symbicort 160/4.5 2 puffs daily   - continue Ventolin PRN, Montelukast, Fluticasone    BPH  - continue Tamsulosin  -Is/Os    Dispo: [facility]
s/p Staged Total knee replacement   - Pain control  - Bowel regimen  - PT/OT  - VTE PPx per protocol    HTN  Blood pressure meds with hold parameters     Asthmatic Bronchitis  - use Symbicort 160/4.5 2 puffs daily   - continue Ventolin PRN, Montelukast, Fluticasone    BPH  - continue Tamsulosin  -Is/Os    Dispo: [facility]
s/p Staged Total knee replacement  -stage   - Pain control  - Bowel regimen  - PT/OT  - VTE PPx per protocol    HTN  Blood pressure meds with hold parameters     Asthmatic Bronchitis  - use Symbicort 160/4.5 2 puffs daily   - continue Ventolin PRN, Montelukast, Fluticasone    BPH  - continue Tamsulosin  -Is/Os    Dispo: [facility]
s/p Staged Total knee replacement  -stage 2 done today - feels well  - Pain control  - Bowel regimen  - PT/OT  - VTE PPx per protocol    HTN  Blood pressure meds with hold parameters     Asthmatic Bronchitis  - use Symbicort 160/4.5 2 puffs daily   - continue Ventolin PRN, Montelukast, Fluticasone    BPH  - continue Tamsulosin  -Is/Os    Dispo: [facility]

## 2019-05-16 NOTE — PROGRESS NOTE ADULT - REASON FOR ADMISSION
bilat knee pain -- for bilat TKA, right first then left
bilat knee pain-- for bilat staged TKA, right first, then left
bilat knee pain-- for bilat tka, right first then left
bilat knee pain. right first, then left
staged TKR
Patient is a 73y old  Male who presents with a chief complaint of bilat knee pain. right first, then left (09 May 2019 07:35)
Patient is a 73y old  Male who presents with a chief complaint of bilat knee pain. right first, then left (09 May 2019 07:35)
S/P right Total knee replacement staged
S/P right Total knee replacement staged
staged TKR

## 2019-05-16 NOTE — PROGRESS NOTE ADULT - PROVIDER SPECIALTY LIST ADULT
Hospitalist
Orthopedics
Pharmacy
Pharmacy
Hospitalist

## 2019-05-16 NOTE — PROGRESS NOTE ADULT - SUBJECTIVE AND OBJECTIVE BOX
POD#:  2 S/P: Left TKR, POD #7 s/p Right TKR                       SUBJECTIVE: Patient seen and examined.  Feeling well.  Ambulating with PT.   Reported Pain Score = 4    OBJECTIVE:     Vital Signs Last 24 Hrs  T(C): 36.9 (16 May 2019 07:57), Max: 37.2 (15 May 2019 15:22)  T(F): 98.5 (16 May 2019 07:57), Max: 98.9 (15 May 2019 15:22)  HR: 59 (16 May 2019 07:57) (59 - 86)  BP: 103/59 (16 May 2019 07:57) (103/59 - 127/73)  RR: 16 (16 May 2019 07:57) (16 - 17)  SpO2: 92% (16 May 2019 07:57) (92% - 96%)    Left Knee:  Incision clean/dry/intact, prineo intact  Right Knee: Incision CDI, prineo intact  Bilateral LEs:         Sensation:  intact to light touch          Motor exam:  5/5 dorsiflexion/plantarflexion/EHL          2+ DP pulses          calf supple, NT         SCDs in place    LABS:                        8.2    9.66  )-----------( 247      ( 16 May 2019 07:24 )             25.9     05-16    139  |  104  |  17  ----------------------------<  117<H>  4.1   |  30  |  1.01    Ca    8.4      16 May 2019 07:24            MEDICATIONS:  Anticoagulation:  apixaban 2.5 milliGRAM(s) Oral every 12 hours  aspirin enteric coated 81 milliGRAM(s) Oral daily      Pain medications:   acetaminophen   Tablet .. 1000 milliGRAM(s) Oral every 8 hours  celecoxib 200 milliGRAM(s) Oral every 12 hours  HYDROmorphone  Injectable 0.5 milliGRAM(s) IV Push every 3 hours PRN  oxyCODONE    IR 5 milliGRAM(s) Oral every 3 hours PRN  oxyCODONE    IR 10 milliGRAM(s) Oral every 3 hours PRN        A/P : s/p Left TKR POD # 2, Right TKR POD #7  -    Pain control  -    DVT ppx: Eliquis 2.5mg q 12 h, Aspirin 81mg Daily  -    Weight bearing status: WBAT   -    Physical Therapy  -    Occupational Therapy  -    Discharge plan:  home today POD#:  2 S/P: Left TKR, POD #7 s/p Right TKR                       SUBJECTIVE: Patient seen and examined.  Feeling well.  Ambulating with PT.   Reported Pain Score = 4    OBJECTIVE:     Vital Signs Last 24 Hrs  T(C): 36.9 (16 May 2019 07:57), Max: 37.2 (15 May 2019 15:22)  T(F): 98.5 (16 May 2019 07:57), Max: 98.9 (15 May 2019 15:22)  HR: 59 (16 May 2019 07:57) (59 - 86)  BP: 103/59 (16 May 2019 07:57) (103/59 - 127/73)  RR: 16 (16 May 2019 07:57) (16 - 17)  SpO2: 92% (16 May 2019 07:57) (92% - 96%)    Left Knee:  Incision clean/dry/intact, prineo intact  Right Knee: Incision CDI, prineo intact  Bilateral LEs:         Ecchymosis resolving, present more on right than left         Sensation:  intact to light touch          Motor exam:  5/5 dorsiflexion/plantarflexion/EHL          2+ DP pulses          calf supple, NT         SCDs in place    LABS:                        8.2    9.66  )-----------( 247      ( 16 May 2019 07:24 )             25.9     05-16    139  |  104  |  17  ----------------------------<  117<H>  4.1   |  30  |  1.01    Ca    8.4      16 May 2019 07:24            MEDICATIONS:  Anticoagulation:  apixaban 2.5 milliGRAM(s) Oral every 12 hours  aspirin enteric coated 81 milliGRAM(s) Oral daily      Pain medications:   acetaminophen   Tablet .. 1000 milliGRAM(s) Oral every 8 hours  celecoxib 200 milliGRAM(s) Oral every 12 hours  HYDROmorphone  Injectable 0.5 milliGRAM(s) IV Push every 3 hours PRN  oxyCODONE    IR 5 milliGRAM(s) Oral every 3 hours PRN  oxyCODONE    IR 10 milliGRAM(s) Oral every 3 hours PRN        A/P : s/p Left TKR POD # 2, Right TKR POD #7  -    Pain control  -    DVT ppx: Eliquis 2.5mg q 12 h, Aspirin 81mg Daily  -    Weight bearing status: WBAT   -    Physical Therapy  -    Occupational Therapy  -    Discharge plan:  home today

## 2019-05-21 ENCOUNTER — CHART COPY (OUTPATIENT)
Age: 74
End: 2019-05-21

## 2019-05-21 RX ORDER — OXYCODONE 5 MG/1
5 TABLET ORAL
Qty: 60 | Refills: 0 | Status: ACTIVE | COMMUNITY
Start: 2019-05-21 | End: 1900-01-01

## 2019-06-03 ENCOUNTER — APPOINTMENT (OUTPATIENT)
Dept: ORTHOPEDIC SURGERY | Facility: CLINIC | Age: 74
End: 2019-06-03
Payer: MEDICARE

## 2019-06-03 VITALS — DIASTOLIC BLOOD PRESSURE: 82 MMHG | SYSTOLIC BLOOD PRESSURE: 139 MMHG | HEART RATE: 70 BPM

## 2019-06-03 PROCEDURE — 99024 POSTOP FOLLOW-UP VISIT: CPT

## 2019-06-03 PROCEDURE — 73562 X-RAY EXAM OF KNEE 3: CPT | Mod: RT

## 2019-06-03 RX ORDER — AMOXICILLIN 500 MG/1
500 CAPSULE ORAL
Qty: 20 | Refills: 4 | Status: ACTIVE | COMMUNITY
Start: 2019-06-03 | End: 1900-01-01

## 2019-06-03 RX ORDER — OXYCODONE 5 MG/1
5 TABLET ORAL
Qty: 60 | Refills: 0 | Status: ACTIVE | COMMUNITY
Start: 2019-06-03 | End: 1900-01-01

## 2019-06-10 ENCOUNTER — MED ADMIN CHARGE (OUTPATIENT)
Age: 74
End: 2019-06-10

## 2019-06-25 ENCOUNTER — CHART COPY (OUTPATIENT)
Age: 74
End: 2019-06-25

## 2019-06-25 RX ORDER — OXYCODONE 5 MG/1
5 TABLET ORAL
Qty: 40 | Refills: 0 | Status: ACTIVE | COMMUNITY
Start: 2019-06-25 | End: 1900-01-01

## 2019-07-16 ENCOUNTER — APPOINTMENT (OUTPATIENT)
Dept: ORTHOPEDIC SURGERY | Facility: CLINIC | Age: 74
End: 2019-07-16
Payer: MEDICARE

## 2019-07-16 VITALS
DIASTOLIC BLOOD PRESSURE: 74 MMHG | SYSTOLIC BLOOD PRESSURE: 127 MMHG | HEIGHT: 67 IN | BODY MASS INDEX: 36.73 KG/M2 | HEART RATE: 63 BPM | WEIGHT: 234 LBS

## 2019-07-16 PROCEDURE — 73562 X-RAY EXAM OF KNEE 3: CPT | Mod: LT

## 2019-07-16 PROCEDURE — 99024 POSTOP FOLLOW-UP VISIT: CPT

## 2019-10-27 NOTE — OCCUPATIONAL THERAPY INITIAL EVALUATION ADULT - IMPAIRED TRANSFERS: SIT/STAND, REHAB EVAL
decreased strength oral Pt able to side step & walk forward/backward at bedside with RW, assist x 2/decreased strength

## 2020-10-27 ENCOUNTER — APPOINTMENT (OUTPATIENT)
Dept: ORTHOPEDIC SURGERY | Facility: CLINIC | Age: 75
End: 2020-10-27
Payer: MEDICARE

## 2020-10-27 DIAGNOSIS — Z96.653 PRESENCE OF ARTIFICIAL KNEE JOINT, BILATERAL: ICD-10-CM

## 2020-10-27 PROCEDURE — 99213 OFFICE O/P EST LOW 20 MIN: CPT

## 2020-10-27 PROCEDURE — 99072 ADDL SUPL MATRL&STAF TM PHE: CPT

## 2020-10-27 PROCEDURE — 73562 X-RAY EXAM OF KNEE 3: CPT | Mod: RT

## 2020-10-27 RX ORDER — AMOXICILLIN 500 MG/1
500 TABLET, FILM COATED ORAL
Qty: 20 | Refills: 1 | Status: ACTIVE | COMMUNITY
Start: 2020-10-27 | End: 1900-01-01

## 2021-12-21 NOTE — PATIENT PROFILE ADULT - NSPROMUTANXFEARADDRESSFT_GEN_A_NUR
Problem: At Risk for Falls  Goal: # Patient does not fall  Outcome: Outcome Met, Continue evaluating goal progress toward completion  Goal: # Takes action to control fall-related risks  Outcome: Outcome Met, Continue evaluating goal progress toward completion  Goal: # Verbalizes understanding of fall risk/precautions  Description: Document education using the patient education activity  Outcome: Outcome Met, Continue evaluating goal progress toward completion     Problem: Pressure Injury, Risk for  Goal: # Skin remains intact  Outcome: Outcome Met, Continue evaluating goal progress toward completion  Goal: No new pressure injury (PI) development  Outcome: Outcome Met, Continue evaluating goal progress toward completion  Goal: # Verbalizes understanding of PI risk factors and prevention strategies  Description: Document education using the patient education activity.   Outcome: Outcome Met, Continue evaluating goal progress toward completion     Problem: Pressure Injury Actual  Goal: # No deterioration in pressure injury (PI)  Outcome: Outcome Met, Continue evaluating goal progress toward completion  Goal: # Verbalizes pressure injury management  Description: Document education using the patient education activity.  Outcome: Outcome Met, Continue evaluating goal progress toward completion     Pt returned to floor at 20:15 from wound debridement. Pt oriented x 4, vitals monitored per protocol. Pt drowsy, slept throughout night with no s/s distress. Pt remains on 2 L oxygen via nasal cannula. Pt dressing reinforced x1. SCDs on. Call light, phone, and personal belongings kept within reach. Hourly rounding implemented.    none

## 2022-08-03 NOTE — OCCUPATIONAL THERAPY INITIAL EVALUATION ADULT - PATIENT/FAMILY/SIGNIFICANT OTHER GOALS STATEMENT, OT EVAL
I suspect that this most recent episode of acute vomiting was probably related to more functional matters related to acute stress.      Continue the fiber supplementation, and continue to use the Miralax powder every day, or however often it takes to get regular bowel movements, somewhere between twice per day to every other day.       Continue all medications at the same doses.  Contact your usual pharmacy if you need refills.      Use the Metoclopramide and or the Lorazepam as needed for future acute episodes of nausea and vomiting.      [facility] recommended a atrial of another medication on a daily basis to help prevent episodes called Amitriptyline. However, this medication has possible interactions with numerous other more important medications from your psychiatrist, so I would prefer to avoid it for now.       Follow up with the [facility] as planned in September.       Consider removing gluten from his diet to see if this has any impact on your intestinal issues or possibly improve autism symptoms.                 
To get better
To get better

## 2023-06-07 NOTE — CONSULT NOTE ADULT - PROVIDER SPECIALTY LIST ADULT
Hospitalist Left message for patient to call Dr Gonzales's office with any questions or concerns.

## 2023-10-03 NOTE — PHYSICAL THERAPY INITIAL EVALUATION ADULT - SITTING BALANCE: STATIC
Urine Toxicology Performed in Last 12 Months    No Benzodiazepines on Active Med List   Rx Refill Note  Requested Prescriptions     Pending Prescriptions Disp Refills    zolpidem (AMBIEN) 10 MG tablet 90 tablet 0     Sig: Take 1 tablet by mouth Every Night.      Last office visit with prescribing clinician: 8/24/2023   Last telemedicine visit with prescribing clinician: Visit date not found   Next office visit with prescribing clinician: 10/16/2023                         Would you like a call back once the refill request has been completed: [] Yes [] No    If the office needs to give you a call back, can they leave a voicemail: [] Yes [] No    Brianda Maloney, PCT  10/03/23, 12:40 EDT    
fair plus
normal balance

## 2024-02-06 NOTE — DISCHARGE NOTE PROVIDER - NSDCQMCOGNITION_NEU_ALL_CORE
Outreach attempt was made to schedule a Medicare Wellness Visit. This was the first attempt. Contact was made, [facility] appointment scheduled.   
No difficulties

## 2024-08-01 NOTE — PHARMACOTHERAPY INTERVENTION NOTE - INTERVENTION TYPE MISC
Transition of Care Detail Level: Detailed Quality 226: Preventive Care And Screening: Tobacco Use: Screening And Cessation Intervention: Patient screened for tobacco use and is an ex/non-smoker

## 2024-08-20 ENCOUNTER — NEW REFERRAL (OUTPATIENT)
Dept: URBAN - METROPOLITAN AREA CLINIC 19 | Facility: CLINIC | Age: 79
End: 2024-08-20

## 2024-08-20 DIAGNOSIS — H35.463: ICD-10-CM

## 2024-08-20 DIAGNOSIS — H43.813: ICD-10-CM

## 2024-08-20 DIAGNOSIS — H52.13: ICD-10-CM

## 2024-08-20 DIAGNOSIS — H35.411: ICD-10-CM

## 2024-08-20 DIAGNOSIS — H33.313: ICD-10-CM

## 2024-08-20 PROCEDURE — 67145 PROPH RTA DTCHMNT PC: CPT

## 2024-08-20 PROCEDURE — 92134 CPTRZ OPH DX IMG PST SGM RTA: CPT | Mod: NC

## 2024-08-20 PROCEDURE — 92201 OPSCPY EXTND RTA DRAW UNI/BI: CPT | Mod: NC

## 2024-08-20 PROCEDURE — 92250 FUNDUS PHOTOGRAPHY W/I&R: CPT

## 2024-08-20 PROCEDURE — 99204 OFFICE O/P NEW MOD 45 MIN: CPT | Mod: 25

## 2024-08-20 ASSESSMENT — VISUAL ACUITY
OD_CC: 20/25-2
OS_CC: 20/60-1
OS_PH: 20/50

## 2024-08-20 ASSESSMENT — TONOMETRY
OD_IOP_MMHG: 12
OS_IOP_MMHG: 12

## 2024-09-07 ENCOUNTER — FOLLOW UP (OUTPATIENT)
Dept: URBAN - METROPOLITAN AREA CLINIC 19 | Facility: CLINIC | Age: 79
End: 2024-09-07

## 2024-09-07 DIAGNOSIS — H43.813: ICD-10-CM

## 2024-09-07 DIAGNOSIS — H33.313: ICD-10-CM

## 2024-09-07 DIAGNOSIS — H52.13: ICD-10-CM

## 2024-09-07 DIAGNOSIS — H35.411: ICD-10-CM

## 2024-09-07 DIAGNOSIS — H35.463: ICD-10-CM

## 2024-09-07 PROCEDURE — 92014 COMPRE OPH EXAM EST PT 1/>: CPT

## 2024-09-07 PROCEDURE — 92201 OPSCPY EXTND RTA DRAW UNI/BI: CPT

## 2024-09-07 ASSESSMENT — VISUAL ACUITY
OD_CC: 20/25
OS_CC: 20/60
OS_PH: 20/40

## 2024-09-07 ASSESSMENT — TONOMETRY
OS_IOP_MMHG: 14
OD_IOP_MMHG: 14

## 2025-03-29 ENCOUNTER — NON-APPOINTMENT (OUTPATIENT)
Age: 80
End: 2025-03-29

## 2025-03-31 ENCOUNTER — APPOINTMENT (OUTPATIENT)
Dept: GASTROENTEROLOGY | Facility: CLINIC | Age: 80
End: 2025-03-31
Payer: MEDICARE

## 2025-03-31 VITALS
BODY MASS INDEX: 34.21 KG/M2 | DIASTOLIC BLOOD PRESSURE: 81 MMHG | TEMPERATURE: 97.3 F | OXYGEN SATURATION: 97 % | HEART RATE: 76 BPM | HEIGHT: 67 IN | WEIGHT: 218 LBS | SYSTOLIC BLOOD PRESSURE: 124 MMHG

## 2025-03-31 DIAGNOSIS — R19.5 OTHER FECAL ABNORMALITIES: ICD-10-CM

## 2025-03-31 DIAGNOSIS — Z86.0100 PERSONAL HISTORY OF COLON POLYPS, UNSPECIFIED: ICD-10-CM

## 2025-03-31 DIAGNOSIS — R13.10 DYSPHAGIA, UNSPECIFIED: ICD-10-CM

## 2025-03-31 DIAGNOSIS — Z80.42 FAMILY HISTORY OF MALIGNANT NEOPLASM OF PROSTATE: ICD-10-CM

## 2025-03-31 DIAGNOSIS — Z86.79 PERSONAL HISTORY OF OTHER DISEASES OF THE CIRCULATORY SYSTEM: ICD-10-CM

## 2025-03-31 DIAGNOSIS — Z82.49 FAMILY HISTORY OF ISCHEMIC HEART DISEASE AND OTHER DISEASES OF THE CIRCULATORY SYSTEM: ICD-10-CM

## 2025-03-31 DIAGNOSIS — J44.89 OTHER SPECIFIED CHRONIC OBSTRUCTIVE PULMONARY DISEASE: ICD-10-CM

## 2025-03-31 PROCEDURE — 99203 OFFICE O/P NEW LOW 30 MIN: CPT

## 2025-03-31 RX ORDER — MONTELUKAST 10 MG/1
10 TABLET, FILM COATED ORAL
Refills: 0 | Status: ACTIVE | COMMUNITY

## 2025-03-31 RX ORDER — AMLODIPINE BESYLATE 10 MG/1
10 TABLET ORAL
Refills: 0 | Status: ACTIVE | COMMUNITY

## 2025-03-31 RX ORDER — POLYETHYLENE GLYCOL 3350 AND ELECTROLYTES WITH LEMON FLAVOR 236; 22.74; 6.74; 5.86; 2.97 G/4L; G/4L; G/4L; G/4L; G/4L
236 POWDER, FOR SOLUTION ORAL
Qty: 1 | Refills: 0 | Status: ACTIVE | COMMUNITY
Start: 2025-03-31 | End: 1900-01-01

## 2025-03-31 RX ORDER — TAMSULOSIN HYDROCHLORIDE 0.4 MG/1
0.4 CAPSULE ORAL
Refills: 0 | Status: ACTIVE | COMMUNITY

## 2025-03-31 RX ORDER — ASPIRIN 81 MG/1
81 TABLET ORAL
Refills: 0 | Status: ACTIVE | COMMUNITY

## 2025-03-31 RX ORDER — LOSARTAN POTASSIUM 100 MG/1
100 TABLET, FILM COATED ORAL
Refills: 0 | Status: ACTIVE | COMMUNITY

## 2025-04-07 NOTE — ASU PATIENT PROFILE, ADULT - NSICDXPASTSURGICALHX_GEN_ALL_CORE_FT
PAST SURGICAL HISTORY:  H/O colonoscopy     S/P arthroscopy of left knee     S/P correction of deviated nasal septum 2009    S/P left inguinal hernia repair

## 2025-04-07 NOTE — ASU PATIENT PROFILE, ADULT - NSICDXPASTMEDICALHX_GEN_ALL_CORE_FT
PAST MEDICAL HISTORY:  Asthmatic bronchitis , chronic     BPH (benign prostatic hyperplasia)     HTN (hypertension)     Osteoarthritis

## 2025-04-08 ENCOUNTER — TRANSCRIPTION ENCOUNTER (OUTPATIENT)
Age: 80
End: 2025-04-08

## 2025-04-08 ENCOUNTER — OUTPATIENT (OUTPATIENT)
Dept: OUTPATIENT SERVICES | Facility: HOSPITAL | Age: 80
LOS: 1 days | End: 2025-04-08
Payer: MEDICARE

## 2025-04-08 ENCOUNTER — RESULT REVIEW (OUTPATIENT)
Age: 80
End: 2025-04-08

## 2025-04-08 ENCOUNTER — APPOINTMENT (OUTPATIENT)
Dept: GASTROENTEROLOGY | Facility: HOSPITAL | Age: 80
End: 2025-04-08

## 2025-04-08 VITALS
HEIGHT: 67 IN | HEART RATE: 63 BPM | OXYGEN SATURATION: 97 % | SYSTOLIC BLOOD PRESSURE: 115 MMHG | DIASTOLIC BLOOD PRESSURE: 63 MMHG | TEMPERATURE: 98 F | RESPIRATION RATE: 12 BRPM | WEIGHT: 210.1 LBS

## 2025-04-08 VITALS
OXYGEN SATURATION: 98 % | RESPIRATION RATE: 13 BRPM | HEART RATE: 57 BPM | DIASTOLIC BLOOD PRESSURE: 59 MMHG | SYSTOLIC BLOOD PRESSURE: 124 MMHG

## 2025-04-08 DIAGNOSIS — Z86.010 PERSONAL HISTORY OF COLON POLYPS: ICD-10-CM

## 2025-04-08 DIAGNOSIS — Z98.890 OTHER SPECIFIED POSTPROCEDURAL STATES: Chronic | ICD-10-CM

## 2025-04-08 PROCEDURE — 88305 TISSUE EXAM BY PATHOLOGIST: CPT

## 2025-04-08 PROCEDURE — 45380 COLONOSCOPY AND BIOPSY: CPT

## 2025-04-08 PROCEDURE — 88305 TISSUE EXAM BY PATHOLOGIST: CPT | Mod: 26

## 2025-04-08 PROCEDURE — 45385 COLONOSCOPY W/LESION REMOVAL: CPT

## 2025-04-08 RX ADMIN — Medication 30 MILLILITER(S): at 08:30

## 2025-04-08 NOTE — ASU DISCHARGE PLAN (ADULT/PEDIATRIC) - NS MD DC FALL RISK RISK
For information on Fall & Injury Prevention, visit: [email or web address] OR  [email or web address] OR  https://www.cdc.gov/steadi/patient.html

## 2025-04-08 NOTE — CHART NOTE - NSCHARTNOTEFT_GEN_A_CORE
History of Present Illness       The patient is a 79-year-old  male with past medical history significant for hypertension, BPH, obstructive sleep apnea on C-PAP and asthma/chronic bronchitis who was referred to my office by Dr. Asha Madsen for (+) Cologuard test. The patient has a history of colon polyps.  The patient also admits to having dysphagia. I was asked to render an opinion for consultation for the above complaints. The patient states that he is feeling fine. The patient denies any abdominal pain. The patient denies any abdominal gas and bloating. The patient denies any nausea or vomiting. The patient complains of occasional dysphagia (with rice) but denies any gastroesophageal reflux disease. The dysphagia is worse with solids but unrelated to liquids. The patient denies taking medications for the dysphagia. The patient denies any atypical chest pain, shortness of breath or palpitations. The patient denies any diaphoresis. The patient denies any constipation or diarrhea. The patient has 1 to 2 bowel movements a day. The patient denies a change in bowel habits. The patient denies a change in caliber of stool. The patient denies having mucus discharge with the bowel movements. The patient denies any bright red blood per rectum, melena or hematemesis. The patient denies any rectal pain or rectal pruritus. The patient denies any weight loss or anorexia. The patient admits to gaining weight recently. He denies any fevers or chills. The patient denies any jaundice or pruritus. The patient complains of occasional lower back pain. The patient admits to having a prior upper endoscopy and colonoscopy performed by another gastroenterologist. According to the patient, the colonoscopic findings revealed colon polyps and internal hemorrhoids. The patient denies any significant family history of GI problems.  ?  (+) prior smoking 1 PPD x 20 years, stopped x 25 to 30 years ago, (+) prior binge ETOH use in the weekends stopped x 25 years ago, (-) IVDA  ?  Physical Exam:  General Appearance: Overweight, no acute distress  ENT: nose clear, ears unremarkable  Eyes: No enteric sclera, conjunctiva clear.  Neck: Supple, without masses  Respiratory: Breath sounds equal and bilateral, no wheezing no rales or rhonchi  Cardiovascular: S1-S2 audible, no murmur, no rubs or gallops  GI: (+) BS, soft, nontender, no rebound, no guarding, no masses  Liver: liver edge palpated  Rectal: not done  Musculo-skeletal: Good motor strength, good range of motion, normal appearing extremities  Skin: Normal appearing skin, no jaundice, no rashes or nodules  Neurological: without focal motor or sensory deficits Patient is moving all extremities spontaneously and to command with normal muscle strength alert and oriented X3  Psychiatric: Good affect, not depressed, not anxious  ?  Active Problems  Primary localized osteoarthritis of knees, bilateral (715.16) (M17.0)  Status post total bilateral knee replacement (V43.65) (Z96.653)           ?  Past Medical History  History of Chronic bronchiolitis (491.8) (J44.89)  History of hypertension (V12.59) (Z86.79)           ?  Surgical History  History of Hernia Repair  History of Knee Replacement  History of Nasal Septal Deviation Repair           ?  Family History  Family history of hypertension (V17.49) (Z82.49) : Father  Family history of malignant neoplasm of prostate (V16.42) (Z80.42) : Father           ?  Current Meds  amLODIPine Besylate 10 MG Oral Tablet  Aspirin 81 MG Oral Tablet Delayed Release  Losartan Potassium 100 MG Oral Tablet  Montelukast Sodium 10 MG Oral Tablet  Tamsulosin HCl - 0.4 MG Oral Capsule           Allergies  No Known Drug Allergies           ?  Vitals  Vital Signs  ?  Recorded: 31Mar2025 01:39PM  Systolic  124, LUE, Sitting  Diastolic  81, LUE, Sitting  Height  5 ft 7 in  Weight  218 lb  BMI Calculated  34.14 kg/m2  BSA Calculated  2.1 m2  Temperature  97.3 F  Heart Rate  76  O2 Saturation  97 %, Room Air  FiO2 Flow Rate  0 L/min, Room Air           ?  Assessment  Positive colorectal cancer screening using Cologuard test (787.7) (R19.5)  History of colon polyps (V12.72) (Z86.0100)  Dysphagia, unspecified type (787.20) (R13.10)    (+) Cologuard test: The patient was found to have a (+) Cologuard test. The patient is to proceed with the colonoscopy to assess the (+) Cologuard test. I recommend a colonoscopy to assess the (+) Cologuard test. The patient was told of the risks and benefits of the procedure. The patient was told of the risks of perforation, emergency surgery, bleeding, infections and missed lesions. The patient is to be on a clear liquid diet the entire day prior to the procedure. The patient is to complete the entire prescribed bowel prep the day prior to the procedure as directed. The patient is told not to drive, drink alcohol, use recreational drugs, exercise, or work the day of the procedure. The patient was told of the need for an escort to accompany the patient home after the procedure. The patient is aware that the procedure may be cancelled if they fail to follow the directions. The patient agreed and will schedule for the procedure. The patient can take the antihypertensive medication with a sip of water one hour prior to the procedure. The patient is to be n.p.o. after midnight and bowel prep was given. The patient is to return for the procedure.  History of Colonic Polyps: The patient has a history of colonic polyps. I recommend a repeat colonoscopy to reassess for colonic polyps. The patient was told of the risks and benefits of the procedure. The patient was told of the risks of perforation, emergency surgery, bleeding, infections and missed lesions. The patient is to be on a clear liquid diet the entire day prior to the procedure. The patient is to complete the entire prescribed bowel prep the day prior to the procedure as directed. The patient is told not to drive, drink alcohol, use recreational drugs, exercise, or work the day of the procedure. The patient was told of the need for an escort to accompany the patient home after the procedure. The patient is aware that the procedure may be cancelled if they fail to follow the directions. The patient agreed and will schedule for the procedure. The patient can take the antihypertensive medication with a sip of water one hour prior to the procedure. The patient is to be n.p.o. after midnight and bowel prep was given. The patient is to return for the procedure.  Colonoscopy: I recommend a colonoscopy to assess the symptoms and for colonic polyps. The patient was told of the risks and benefits of the procedure. The patient was told of the risks of perforation, emergency surgery, bleeding, infections and missed lesions. The patient is to be on a clear liquid diet the entire day prior to the procedure. The patient is to complete the entire prescribed bowel prep the day prior to the procedure as directed. The patient is told not to drive, drink alcohol, use recreational drugs, exercise, or work the day of the procedure. The patient was told of the need for an escort to accompany the patient home after the procedure. The patient is aware that the procedure may be cancelled if they fail to follow the directions. The patient agreed and will schedule for the procedure. The patient can take the antihypertensive medication with a sip of water one hour prior to the procedure. The patient is to be n.p.o. after midnight and bowel prep was given. The patient is to return for the procedure.  Prior Endoscopic Procedures: The patient had a prior colonoscopy performed by another gastroenterologist. I will try to obtain the prior colonoscopy reports. The patient is to sign a record release to obtain those records.  Follow-up: The patient is to follow-up in the office in 4 weeks to reassess the symptoms. The patient was told to call the office if any further problems.            ?  Plan  History of colon polyps  Start: PEG-3350/Electrolytes 236 GM Oral Solution Reconstituted; MIX AS DIRECTED  AND DRINK OVER 4 HOURS, START AT 4PM  Colonoscopy Screening; Status:Hold For - Scheduling; Requested for:31Mar2025;

## 2025-04-08 NOTE — ASU DISCHARGE PLAN (ADULT/PEDIATRIC) - FINANCIAL ASSISTANCE
[facility] provides services at a reduced cost to those who are determined to be eligible through [facility]’s financial assistance program. Information regarding [facility]’s financial assistance program can be found by going to [email or web address] or by calling [phone].

## 2025-04-10 LAB — SURGICAL PATHOLOGY STUDY: SIGNIFICANT CHANGE UP

## 2025-05-05 ENCOUNTER — APPOINTMENT (OUTPATIENT)
Dept: GASTROENTEROLOGY | Facility: CLINIC | Age: 80
End: 2025-05-05
Payer: MEDICARE

## 2025-05-05 VITALS
HEART RATE: 76 BPM | HEIGHT: 67 IN | BODY MASS INDEX: 33.27 KG/M2 | SYSTOLIC BLOOD PRESSURE: 120 MMHG | DIASTOLIC BLOOD PRESSURE: 76 MMHG | TEMPERATURE: 97.3 F | OXYGEN SATURATION: 95 % | WEIGHT: 212 LBS

## 2025-05-05 DIAGNOSIS — K64.8 OTHER HEMORRHOIDS: ICD-10-CM

## 2025-05-05 DIAGNOSIS — K57.90 DIVERTICULOSIS OF INTESTINE, PART UNSPECIFIED, W/OUT PERFORATION OR ABSCESS W/OUT BLEEDING: ICD-10-CM

## 2025-05-05 DIAGNOSIS — R13.10 DYSPHAGIA, UNSPECIFIED: ICD-10-CM

## 2025-05-05 DIAGNOSIS — R10.13 EPIGASTRIC PAIN: ICD-10-CM

## 2025-05-05 DIAGNOSIS — Z86.0100 PERSONAL HISTORY OF COLON POLYPS, UNSPECIFIED: ICD-10-CM

## 2025-05-05 DIAGNOSIS — D12.6 BENIGN NEOPLASM OF COLON, UNSPECIFIED: ICD-10-CM

## 2025-05-05 PROCEDURE — 99214 OFFICE O/P EST MOD 30 MIN: CPT

## 2025-05-05 RX ORDER — HYDROCORTISONE 25 MG/G
2.5 CREAM TOPICAL 3 TIMES DAILY
Qty: 2 | Refills: 3 | Status: ACTIVE | COMMUNITY
Start: 2025-05-05 | End: 1900-01-01

## 2025-05-05 RX ORDER — MULTIVITAMIN
TABLET ORAL
Refills: 0 | Status: ACTIVE | COMMUNITY

## 2025-05-05 RX ORDER — PANTOPRAZOLE 40 MG/1
40 TABLET, DELAYED RELEASE ORAL DAILY
Qty: 30 | Refills: 3 | Status: ACTIVE | COMMUNITY
Start: 2025-05-05 | End: 1900-01-01

## 2025-05-05 RX ORDER — SIMETHICONE 125 MG/1
125 TABLET, CHEWABLE ORAL
Qty: 120 | Refills: 2 | Status: ACTIVE | COMMUNITY
Start: 2025-05-05 | End: 1900-01-01

## (undated) DEVICE — CATH ELCTR GLIDE PRB 7FR

## (undated) DEVICE — SNARE LOOP POLY DISP 30MM LOOP

## (undated) DEVICE — KIT ENDO PROCEDURE CUST W/VLV

## (undated) DEVICE — LUBRICATING JELLY ONESHOT 1.25OZ

## (undated) DEVICE — RETRIEVER ROTH NET PLATINUM-UNIVERSAL

## (undated) DEVICE — SOLIDIFIER ISOLYZER 2000CC

## (undated) DEVICE — CLAMP BX HOT RAD JAW 3

## (undated) DEVICE — DRSG CURITY GAUZE SPONGE 4 X 4" 12-PLY

## (undated) DEVICE — STERIS DEFENDO 3-PIECE KIT (AIR/WATER, SUCTION & BIOPSY VALVES)

## (undated) DEVICE — SOL INJ NS 0.9% 500ML 1-PORT

## (undated) DEVICE — VALVE ENDO SURESEAL II 0-5FR

## (undated) DEVICE — TUBING MEDI-VAC W MAXIGRIP CONNECTORS 1/4"X6'

## (undated) DEVICE — TUBING IV SET GRAVITY 3Y 100" MACRO

## (undated) DEVICE — NDL INJ SCLERO INTERJECT 23G

## (undated) DEVICE — SYR LUER LOK 50CC

## (undated) DEVICE — TUBING CANNULA SALTER LABS NASAL ADULT 7FT

## (undated) DEVICE — FORCEP RADIAL JAW 4 JUMBO NO NDL DISP

## (undated) DEVICE — ADAPTER ENDO CHNL SINGLE USE

## (undated) DEVICE — FORCEP RADIAL JAW 4 W NDL 2.2MM 2.8MM 240CM ORANGE DISP

## (undated) DEVICE — SENSOR O2 FINGER ADULT

## (undated) DEVICE — FORMALIN CONTAINER MED

## (undated) DEVICE — FORCEP BIOPSY 2.5MM DISP